# Patient Record
Sex: MALE | Race: WHITE | Employment: OTHER | URBAN - METROPOLITAN AREA
[De-identification: names, ages, dates, MRNs, and addresses within clinical notes are randomized per-mention and may not be internally consistent; named-entity substitution may affect disease eponyms.]

---

## 2017-04-24 PROBLEM — R63.4 WEIGHT LOSS: Status: ACTIVE | Noted: 2017-04-24

## 2017-04-27 PROBLEM — R01.1 HEART MURMUR: Status: ACTIVE | Noted: 2017-04-27

## 2017-06-06 ENCOUNTER — HOSPITAL ENCOUNTER (OUTPATIENT)
Dept: CT IMAGING | Age: 72
Discharge: HOME | End: 2017-06-06
Attending: INTERNAL MEDICINE
Payer: MEDICARE

## 2017-06-06 VITALS — BODY MASS INDEX: 30.1 KG/M2 | WEIGHT: 215 LBS | HEIGHT: 71 IN

## 2017-06-06 DIAGNOSIS — R22.1 MASS IN NECK: ICD-10-CM

## 2017-06-06 DIAGNOSIS — R93.89 ABNORMAL DOPPLER ULTRASOUND OF CAROTID ARTERY: ICD-10-CM

## 2017-06-06 LAB — CREAT BLD-MCNC: 1.1 MG/DL (ref 0.6–1)

## 2017-06-06 PROCEDURE — 74011000258 HC RX REV CODE- 258: Performed by: INTERNAL MEDICINE

## 2017-06-06 PROCEDURE — 74011636320 HC RX REV CODE- 636/320: Performed by: INTERNAL MEDICINE

## 2017-06-06 PROCEDURE — 70498 CT ANGIOGRAPHY NECK: CPT

## 2017-06-06 PROCEDURE — 82565 ASSAY OF CREATININE: CPT

## 2017-06-06 RX ORDER — SODIUM CHLORIDE 0.9 % (FLUSH) 0.9 %
10 SYRINGE (ML) INJECTION
Status: COMPLETED | OUTPATIENT
Start: 2017-06-06 | End: 2017-06-06

## 2017-06-06 RX ADMIN — Medication 10 ML: at 10:45

## 2017-06-06 RX ADMIN — SODIUM CHLORIDE 100 ML: 900 INJECTION, SOLUTION INTRAVENOUS at 10:45

## 2017-06-06 RX ADMIN — IOPAMIDOL 80 ML: 755 INJECTION, SOLUTION INTRAVENOUS at 10:45

## 2017-06-06 NOTE — PROGRESS NOTES
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 For both of these, pt needs a referral to Vascular; please ask to make the appt soon.

## 2017-07-20 PROBLEM — I35.0 NONRHEUMATIC AORTIC VALVE STENOSIS: Status: ACTIVE | Noted: 2017-07-20

## 2017-07-20 PROBLEM — D44.6 CAROTID BODY TUMOR (HCC): Status: ACTIVE | Noted: 2017-07-20

## 2017-08-28 PROBLEM — R79.89 ELEVATED LIVER FUNCTION TESTS: Status: ACTIVE | Noted: 2017-08-28

## 2017-08-28 PROBLEM — E55.9 VITAMIN D DEFICIENCY: Status: ACTIVE | Noted: 2017-08-28

## 2018-07-12 PROBLEM — R01.1 HEART MURMUR: Status: RESOLVED | Noted: 2017-04-27 | Resolved: 2018-07-12

## 2018-08-02 ENCOUNTER — HOSPITAL ENCOUNTER (OUTPATIENT)
Dept: ULTRASOUND IMAGING | Age: 73
Discharge: HOME | End: 2018-08-02
Attending: INTERNAL MEDICINE
Payer: MEDICARE

## 2018-08-02 DIAGNOSIS — R79.89 ELEVATED LIVER FUNCTION TESTS: ICD-10-CM

## 2018-08-02 PROCEDURE — 76700 US EXAM ABDOM COMPLETE: CPT

## 2018-08-02 NOTE — PROGRESS NOTES
US does not show fatty liver--has some increase in spleen and fluid in abdomen that can be seen in chronic liver disease though-does have gallstones--I think a GI opinion about the liver enzymes may be reasonable

## 2019-01-10 PROBLEM — R68.89 COLD INTOLERANCE: Status: ACTIVE | Noted: 2019-01-10

## 2019-01-10 PROBLEM — Z00.00 ROUTINE GENERAL MEDICAL EXAMINATION AT A HEALTH CARE FACILITY: Status: ACTIVE | Noted: 2019-01-10

## 2019-01-10 PROBLEM — Z23 ENCOUNTER FOR IMMUNIZATION: Status: ACTIVE | Noted: 2019-01-10

## 2019-01-14 PROBLEM — K74.69 OTHER CIRRHOSIS OF LIVER (HCC): Status: ACTIVE | Noted: 2017-08-28

## 2019-04-16 ENCOUNTER — ANESTHESIA EVENT (OUTPATIENT)
Dept: ENDOSCOPY | Age: 74
End: 2019-04-16
Payer: MEDICARE

## 2019-04-16 ENCOUNTER — HOSPITAL ENCOUNTER (OUTPATIENT)
Age: 74
Setting detail: OUTPATIENT SURGERY
Discharge: HOME | End: 2019-04-16
Attending: INTERNAL MEDICINE | Admitting: INTERNAL MEDICINE
Payer: MEDICARE

## 2019-04-16 ENCOUNTER — ANESTHESIA (OUTPATIENT)
Dept: ENDOSCOPY | Age: 74
End: 2019-04-16
Payer: MEDICARE

## 2019-04-16 VITALS
TEMPERATURE: 98.6 F | RESPIRATION RATE: 18 BRPM | BODY MASS INDEX: 27.92 KG/M2 | HEIGHT: 70 IN | OXYGEN SATURATION: 96 % | SYSTOLIC BLOOD PRESSURE: 125 MMHG | WEIGHT: 195 LBS | DIASTOLIC BLOOD PRESSURE: 64 MMHG | HEART RATE: 97 BPM

## 2019-04-16 LAB — GLUCOSE BLD STRIP.AUTO-MCNC: 98 MG/DL (ref 65–100)

## 2019-04-16 PROCEDURE — 82962 GLUCOSE BLOOD TEST: CPT

## 2019-04-16 PROCEDURE — 74011250636 HC RX REV CODE- 250/636

## 2019-04-16 PROCEDURE — 88312 SPECIAL STAINS GROUP 1: CPT

## 2019-04-16 PROCEDURE — 77030009426 HC FCPS BIOP ENDOSC BSC -B: Performed by: INTERNAL MEDICINE

## 2019-04-16 PROCEDURE — 88305 TISSUE EXAM BY PATHOLOGIST: CPT

## 2019-04-16 PROCEDURE — 76040000025: Performed by: INTERNAL MEDICINE

## 2019-04-16 PROCEDURE — 76060000031 HC ANESTHESIA FIRST 0.5 HR: Performed by: INTERNAL MEDICINE

## 2019-04-16 PROCEDURE — 74011250636 HC RX REV CODE- 250/636: Performed by: ANESTHESIOLOGY

## 2019-04-16 RX ORDER — SODIUM CHLORIDE, SODIUM LACTATE, POTASSIUM CHLORIDE, CALCIUM CHLORIDE 600; 310; 30; 20 MG/100ML; MG/100ML; MG/100ML; MG/100ML
25 INJECTION, SOLUTION INTRAVENOUS CONTINUOUS
Status: CANCELLED | OUTPATIENT
Start: 2019-04-16 | End: 2019-04-17

## 2019-04-16 RX ORDER — SODIUM CHLORIDE, SODIUM LACTATE, POTASSIUM CHLORIDE, CALCIUM CHLORIDE 600; 310; 30; 20 MG/100ML; MG/100ML; MG/100ML; MG/100ML
100 INJECTION, SOLUTION INTRAVENOUS CONTINUOUS
Status: DISCONTINUED | OUTPATIENT
Start: 2019-04-16 | End: 2019-04-16 | Stop reason: HOSPADM

## 2019-04-16 RX ORDER — PROPOFOL 10 MG/ML
INJECTION, EMULSION INTRAVENOUS AS NEEDED
Status: DISCONTINUED | OUTPATIENT
Start: 2019-04-16 | End: 2019-04-16 | Stop reason: HOSPADM

## 2019-04-16 RX ADMIN — PROPOFOL 20 MG: 10 INJECTION, EMULSION INTRAVENOUS at 10:58

## 2019-04-16 RX ADMIN — PROPOFOL 10 MG: 10 INJECTION, EMULSION INTRAVENOUS at 11:01

## 2019-04-16 RX ADMIN — PROPOFOL 50 MG: 10 INJECTION, EMULSION INTRAVENOUS at 10:56

## 2019-04-16 RX ADMIN — PROPOFOL 10 MG: 10 INJECTION, EMULSION INTRAVENOUS at 11:00

## 2019-04-16 RX ADMIN — SODIUM CHLORIDE, SODIUM LACTATE, POTASSIUM CHLORIDE, AND CALCIUM CHLORIDE 100 ML/HR: 600; 310; 30; 20 INJECTION, SOLUTION INTRAVENOUS at 09:58

## 2019-04-16 NOTE — ANESTHESIA POSTPROCEDURE EVALUATION
Procedure(s): ESOPHAGOGASTRODUODENOSCOPY (EGD)/ 30 
ESOPHAGOGASTRODUODENAL (EGD) BIOPSY. total IV anesthesia Anesthesia Post Evaluation Multimodal analgesia: multimodal analgesia not used between 6 hours prior to anesthesia start to PACU discharge Patient location during evaluation: bedside Patient participation: complete - patient participated Level of consciousness: awake and alert Pain management: adequate Airway patency: patent Anesthetic complications: no 
Cardiovascular status: hemodynamically stable Respiratory status: acceptable Hydration status: acceptable Vitals Value Taken Time /64 4/16/2019 11:39 AM  
Temp 37 °C (98.6 °F) 4/16/2019 11:10 AM  
Pulse 101 4/16/2019 11:42 AM  
Resp 18 4/16/2019 11:39 AM  
SpO2 98 % 4/16/2019 11:41 AM  
Vitals shown include unvalidated device data.

## 2019-04-16 NOTE — ROUTINE PROCESS
Pt. Discharged to car by Marge Larson with family . Vital signs stable. Able to tolerate PO fluids. Passing gas.  Seen by MD.

## 2019-04-16 NOTE — DISCHARGE INSTRUCTIONS
Gastrointestinal Esophagogastroduodenoscopy (EGD) - Upper Exam Discharge Instructions    1. Call Dr. Bimal Uribe at [phone] for any problems or questions. 2. Contact the doctor's office for follow up appointment as directed. 3. Medication may cause drowsiness for several hours, therefore:  · Do not drive or operate machinery for remainder of the day. · No alcohol today. · Do not make any important or legal decisions for 24 hours. · Do not sign any legal documents for 24 hours. 5. Ordinarily, you may resume regular diet and activity after exam unless otherwise specified by your physician. 6. For mild soreness in your throat you may use  throat lozenges or warm salt-water gargles as needed. Any additional instructions: Follow up as needed     Instructions given to Wol  and other family members.

## 2019-04-16 NOTE — ANESTHESIA PREPROCEDURE EVALUATION
Relevant Problems No relevant active problems Anesthetic History Review of Systems / Medical History Pulmonary Neuro/Psych Cardiovascular Hypertension: well controlled Valvular problems/murmurs: aortic stenosis Exercise tolerance: >4 METS 
  
GI/Hepatic/Renal 
  
 
 
 
Liver disease Endo/Other Diabetes: type 2 Other Findings Physical Exam 
 
Airway Mallampati: II 
TM Distance: > 6 cm Neck ROM: normal range of motion Mouth opening: Normal 
 
 Cardiovascular Rhythm: regular Rate: abnormal 
 
Murmur: Grade 3, Aortic area Dental 
No notable dental hx Pulmonary Breath sounds clear to auscultation Abdominal 
 
 
 
 Other Findings Anesthetic Plan ASA: 3 Anesthesia type: total IV anesthesia Anesthetic plan and risks discussed with: Patient

## 2019-06-10 PROBLEM — K42.9 UMBILICAL HERNIA WITHOUT OBSTRUCTION AND WITHOUT GANGRENE: Status: ACTIVE | Noted: 2019-06-10

## 2019-09-23 PROBLEM — Z23 ENCOUNTER FOR IMMUNIZATION: Status: RESOLVED | Noted: 2019-01-10 | Resolved: 2019-09-23

## 2019-09-23 PROBLEM — Z00.00 ROUTINE GENERAL MEDICAL EXAMINATION AT A HEALTH CARE FACILITY: Status: RESOLVED | Noted: 2019-01-10 | Resolved: 2019-09-23

## 2019-10-16 PROBLEM — R18.8 OTHER ASCITES: Status: ACTIVE | Noted: 2019-10-16

## 2019-11-20 ENCOUNTER — HOSPITAL ENCOUNTER (OUTPATIENT)
Dept: LAB | Age: 74
Discharge: HOME | End: 2019-11-20
Payer: MEDICARE

## 2019-11-20 DIAGNOSIS — K74.60 CIRRHOSIS OF LIVER WITH ASCITES, UNSPECIFIED HEPATIC CIRRHOSIS TYPE (HCC): ICD-10-CM

## 2019-11-20 DIAGNOSIS — R18.8 CIRRHOSIS OF LIVER WITH ASCITES, UNSPECIFIED HEPATIC CIRRHOSIS TYPE (HCC): ICD-10-CM

## 2019-11-20 LAB
ALBUMIN SERPL-MCNC: 3.1 G/DL (ref 3.2–4.6)
ALBUMIN/GLOB SERPL: 0.7 {RATIO} (ref 1.2–3.5)
ALP SERPL-CCNC: 133 U/L (ref 50–136)
ALT SERPL-CCNC: 40 U/L (ref 12–65)
ANION GAP SERPL CALC-SCNC: 6 MMOL/L (ref 7–16)
AST SERPL-CCNC: 54 U/L (ref 15–37)
BILIRUB SERPL-MCNC: 1 MG/DL (ref 0.2–1.1)
BUN SERPL-MCNC: 31 MG/DL (ref 8–23)
CALCIUM SERPL-MCNC: 9 MG/DL (ref 8.3–10.4)
CHLORIDE SERPL-SCNC: 106 MMOL/L (ref 98–107)
CO2 SERPL-SCNC: 25 MMOL/L (ref 21–32)
CREAT SERPL-MCNC: 1.29 MG/DL (ref 0.8–1.5)
ERYTHROCYTE [DISTWIDTH] IN BLOOD BY AUTOMATED COUNT: 13.2 % (ref 11.9–14.6)
GLOBULIN SER CALC-MCNC: 4.5 G/DL (ref 2.3–3.5)
GLUCOSE SERPL-MCNC: 119 MG/DL (ref 65–100)
HCT VFR BLD AUTO: 32.6 % (ref 41.1–50.3)
HGB BLD-MCNC: 11.1 G/DL (ref 13.6–17.2)
INR PPP: 1.3
MCH RBC QN AUTO: 35.1 PG (ref 26.1–32.9)
MCHC RBC AUTO-ENTMCNC: 34 G/DL (ref 31.4–35)
MCV RBC AUTO: 103.2 FL (ref 79.6–97.8)
NRBC # BLD: 0 K/UL (ref 0–0.2)
PLATELET # BLD AUTO: 75 K/UL (ref 150–450)
PMV BLD AUTO: 11.5 FL (ref 9.4–12.3)
POTASSIUM SERPL-SCNC: 4.9 MMOL/L (ref 3.5–5.1)
PROT SERPL-MCNC: 7.6 G/DL (ref 6.3–8.2)
PROTHROMBIN TIME: 16.2 SEC (ref 11.7–14.5)
RBC # BLD AUTO: 3.16 M/UL (ref 4.23–5.6)
SODIUM SERPL-SCNC: 137 MMOL/L (ref 136–145)
WBC # BLD AUTO: 3.5 K/UL (ref 4.3–11.1)

## 2019-11-20 PROCEDURE — 85610 PROTHROMBIN TIME: CPT

## 2019-11-20 PROCEDURE — 85027 COMPLETE CBC AUTOMATED: CPT

## 2019-11-20 PROCEDURE — 36415 COLL VENOUS BLD VENIPUNCTURE: CPT

## 2019-11-20 PROCEDURE — 80053 COMPREHEN METABOLIC PANEL: CPT

## 2020-01-01 ENCOUNTER — HOSPITAL ENCOUNTER (OUTPATIENT)
Dept: LAB | Age: 75
Discharge: HOME | End: 2020-11-03
Payer: MEDICARE

## 2020-01-01 DIAGNOSIS — I35.0 NONRHEUMATIC AORTIC VALVE STENOSIS: ICD-10-CM

## 2020-01-01 LAB
ALBUMIN SERPL-MCNC: 3.4 G/DL (ref 3.2–4.6)
ALBUMIN/GLOB SERPL: 0.9 {RATIO} (ref 1.2–3.5)
ALP SERPL-CCNC: 97 U/L (ref 50–136)
ALT SERPL-CCNC: 40 U/L (ref 12–65)
ANION GAP SERPL CALC-SCNC: 6 MMOL/L (ref 7–16)
AST SERPL-CCNC: 53 U/L (ref 15–37)
BILIRUB SERPL-MCNC: 2.4 MG/DL (ref 0.2–1.1)
BUN SERPL-MCNC: 22 MG/DL (ref 8–23)
CALCIUM SERPL-MCNC: 9.2 MG/DL (ref 8.3–10.4)
CHLORIDE SERPL-SCNC: 102 MMOL/L (ref 98–107)
CO2 SERPL-SCNC: 24 MMOL/L (ref 21–32)
CREAT SERPL-MCNC: 1.24 MG/DL (ref 0.8–1.5)
GLOBULIN SER CALC-MCNC: 3.9 G/DL (ref 2.3–3.5)
GLUCOSE SERPL-MCNC: 131 MG/DL (ref 65–100)
MAGNESIUM SERPL-MCNC: 1.9 MG/DL (ref 1.8–2.4)
POTASSIUM SERPL-SCNC: 4.8 MMOL/L (ref 3.5–5.1)
PROT SERPL-MCNC: 7.3 G/DL (ref 6.3–8.2)
SODIUM SERPL-SCNC: 132 MMOL/L (ref 136–145)

## 2020-01-01 PROCEDURE — 83735 ASSAY OF MAGNESIUM: CPT

## 2020-01-01 PROCEDURE — 36415 COLL VENOUS BLD VENIPUNCTURE: CPT

## 2020-01-01 PROCEDURE — 80053 COMPREHEN METABOLIC PANEL: CPT

## 2020-02-10 PROBLEM — R63.4 WEIGHT LOSS: Status: RESOLVED | Noted: 2017-04-24 | Resolved: 2020-02-10

## 2020-12-03 PROBLEM — D69.6 THROMBOCYTOPENIA (HCC): Status: ACTIVE | Noted: 2020-01-01

## 2021-01-01 ENCOUNTER — HOSPITAL ENCOUNTER (OUTPATIENT)
Dept: CT IMAGING | Age: 76
Discharge: HOME | End: 2021-01-11
Attending: INTERNAL MEDICINE
Payer: MEDICARE

## 2021-01-01 ENCOUNTER — APPOINTMENT (OUTPATIENT)
Dept: ULTRASOUND IMAGING | Age: 76
DRG: 442 | End: 2021-01-01
Attending: INTERNAL MEDICINE
Payer: MEDICARE

## 2021-01-01 ENCOUNTER — HOME CARE VISIT (OUTPATIENT)
Dept: SCHEDULING | Facility: HOME HEALTH | Age: 76
End: 2021-01-01
Payer: MEDICARE

## 2021-01-01 ENCOUNTER — HOME CARE VISIT (OUTPATIENT)
Dept: HOSPICE | Facility: HOSPICE | Age: 76
End: 2021-01-01
Payer: MEDICARE

## 2021-01-01 ENCOUNTER — ANESTHESIA EVENT (OUTPATIENT)
Dept: ENDOSCOPY | Age: 76
DRG: 442 | End: 2021-01-01
Payer: MEDICARE

## 2021-01-01 ENCOUNTER — HOSPITAL ENCOUNTER (OUTPATIENT)
Dept: LAB | Age: 76
Discharge: HOME | End: 2021-03-15
Payer: MEDICARE

## 2021-01-01 ENCOUNTER — HOSPITAL ENCOUNTER (OUTPATIENT)
Dept: INTERVENTIONAL RADIOLOGY/VASCULAR | Age: 76
Discharge: HOME | End: 2021-05-04
Attending: INTERNAL MEDICINE
Payer: MEDICARE

## 2021-01-01 ENCOUNTER — TELEPHONE (OUTPATIENT)
Dept: CASE MANAGEMENT | Age: 76
End: 2021-01-01

## 2021-01-01 ENCOUNTER — HOSPICE ADMISSION (OUTPATIENT)
Dept: HOSPICE | Facility: HOSPICE | Age: 76
End: 2021-01-01

## 2021-01-01 ENCOUNTER — HOSPICE ADMISSION (OUTPATIENT)
Dept: HOSPICE | Facility: HOSPICE | Age: 76
End: 2021-01-01
Payer: MEDICARE

## 2021-01-01 ENCOUNTER — APPOINTMENT (OUTPATIENT)
Dept: GENERAL RADIOLOGY | Age: 76
DRG: 442 | End: 2021-01-01
Attending: EMERGENCY MEDICINE
Payer: MEDICARE

## 2021-01-01 ENCOUNTER — HOSPITAL ENCOUNTER (INPATIENT)
Age: 76
LOS: 10 days | Discharge: NURSING, REHAB OR LONG-TERM CARE | DRG: 442 | End: 2021-05-28
Attending: EMERGENCY MEDICINE | Admitting: INTERNAL MEDICINE
Payer: MEDICARE

## 2021-01-01 ENCOUNTER — HOSPITAL ENCOUNTER (OUTPATIENT)
Dept: INTERVENTIONAL RADIOLOGY/VASCULAR | Age: 76
Discharge: HOME | End: 2021-03-31
Attending: INTERNAL MEDICINE
Payer: MEDICARE

## 2021-01-01 ENCOUNTER — APPOINTMENT (OUTPATIENT)
Dept: CT IMAGING | Age: 76
DRG: 442 | End: 2021-01-01
Attending: FAMILY MEDICINE
Payer: MEDICARE

## 2021-01-01 ENCOUNTER — ANESTHESIA (OUTPATIENT)
Dept: ENDOSCOPY | Age: 76
DRG: 442 | End: 2021-01-01
Payer: MEDICARE

## 2021-01-01 VITALS
RESPIRATION RATE: 8 BRPM | SYSTOLIC BLOOD PRESSURE: 120 MMHG | TEMPERATURE: 97.1 F | DIASTOLIC BLOOD PRESSURE: 90 MMHG | HEART RATE: 140 BPM

## 2021-01-01 VITALS
BODY MASS INDEX: 28.49 KG/M2 | HEART RATE: 111 BPM | OXYGEN SATURATION: 100 % | RESPIRATION RATE: 18 BRPM | WEIGHT: 199 LBS | HEIGHT: 70 IN | TEMPERATURE: 97.6 F | SYSTOLIC BLOOD PRESSURE: 138 MMHG | DIASTOLIC BLOOD PRESSURE: 70 MMHG

## 2021-01-01 VITALS
RESPIRATION RATE: 22 BRPM | SYSTOLIC BLOOD PRESSURE: 100 MMHG | DIASTOLIC BLOOD PRESSURE: 60 MMHG | TEMPERATURE: 99 F | HEART RATE: 90 BPM

## 2021-01-01 VITALS
WEIGHT: 166.1 LBS | HEIGHT: 70 IN | BODY MASS INDEX: 23.78 KG/M2 | DIASTOLIC BLOOD PRESSURE: 76 MMHG | SYSTOLIC BLOOD PRESSURE: 110 MMHG | RESPIRATION RATE: 20 BRPM | HEART RATE: 77 BPM | OXYGEN SATURATION: 98 % | TEMPERATURE: 98 F

## 2021-01-01 VITALS
OXYGEN SATURATION: 99 % | DIASTOLIC BLOOD PRESSURE: 79 MMHG | TEMPERATURE: 97.1 F | SYSTOLIC BLOOD PRESSURE: 148 MMHG | HEART RATE: 108 BPM

## 2021-01-01 VITALS
WEIGHT: 170 LBS | OXYGEN SATURATION: 96 % | BODY MASS INDEX: 24.34 KG/M2 | HEIGHT: 70 IN | DIASTOLIC BLOOD PRESSURE: 62 MMHG | SYSTOLIC BLOOD PRESSURE: 102 MMHG | TEMPERATURE: 98.4 F | HEART RATE: 82 BPM | RESPIRATION RATE: 18 BRPM

## 2021-01-01 DIAGNOSIS — K70.31 ALCOHOLIC CIRRHOSIS OF LIVER WITH ASCITES (HCC): ICD-10-CM

## 2021-01-01 DIAGNOSIS — R18.8 OTHER ASCITES: ICD-10-CM

## 2021-01-01 DIAGNOSIS — R11.0 NAUSEA: ICD-10-CM

## 2021-01-01 DIAGNOSIS — K74.69 OTHER CIRRHOSIS OF LIVER (HCC): ICD-10-CM

## 2021-01-01 DIAGNOSIS — E87.5 ACUTE HYPERKALEMIA: ICD-10-CM

## 2021-01-01 DIAGNOSIS — K92.2 GASTROINTESTINAL HEMORRHAGE, UNSPECIFIED GASTROINTESTINAL HEMORRHAGE TYPE: Primary | ICD-10-CM

## 2021-01-01 DIAGNOSIS — Z51.5 ENCOUNTER FOR PALLIATIVE CARE: ICD-10-CM

## 2021-01-01 DIAGNOSIS — K74.60 UNSPECIFIED CIRRHOSIS OF LIVER (HCC): ICD-10-CM

## 2021-01-01 DIAGNOSIS — R41.0 DISORIENTATION: ICD-10-CM

## 2021-01-01 DIAGNOSIS — R54 FRAILTY: ICD-10-CM

## 2021-01-01 DIAGNOSIS — I35.0 AORTIC VALVE STENOSIS, ETIOLOGY OF CARDIAC VALVE DISEASE UNSPECIFIED: ICD-10-CM

## 2021-01-01 DIAGNOSIS — I35.0 AORTIC VALVE STENOSIS, ETIOLOGY OF CARDIAC VALVE DISEASE UNSPECIFIED: Primary | ICD-10-CM

## 2021-01-01 DIAGNOSIS — Z71.89 ADVANCED CARE PLANNING/COUNSELING DISCUSSION: ICD-10-CM

## 2021-01-01 DIAGNOSIS — E72.20 HYPERAMMONEMIA (HCC): ICD-10-CM

## 2021-01-01 DIAGNOSIS — N17.9 AKI (ACUTE KIDNEY INJURY) (HCC): ICD-10-CM

## 2021-01-01 DIAGNOSIS — K76.6 PORTAL HYPERTENSION (HCC): ICD-10-CM

## 2021-01-01 DIAGNOSIS — R53.83 FATIGUE, UNSPECIFIED TYPE: ICD-10-CM

## 2021-01-01 DIAGNOSIS — K42.9 UMBILICAL HERNIA WITHOUT OBSTRUCTION AND WITHOUT GANGRENE: ICD-10-CM

## 2021-01-01 DIAGNOSIS — E87.1 HYPONATREMIA: ICD-10-CM

## 2021-01-01 LAB
ABO + RH BLD: NORMAL
ALBUMIN SERPL-MCNC: 1.9 G/DL (ref 3.2–4.6)
ALBUMIN SERPL-MCNC: 2.2 G/DL (ref 3.2–4.6)
ALBUMIN SERPL-MCNC: 2.8 G/DL (ref 3.2–4.6)
ALBUMIN SERPL-MCNC: 3 G/DL (ref 3.2–4.6)
ALBUMIN SERPL-MCNC: 3 G/DL (ref 3.2–4.6)
ALBUMIN/GLOB SERPL: 0.6 {RATIO} (ref 1.2–3.5)
ALBUMIN/GLOB SERPL: 0.7 {RATIO} (ref 1.2–3.5)
ALBUMIN/GLOB SERPL: 0.8 {RATIO} (ref 1.2–3.5)
ALBUMIN/GLOB SERPL: 0.9 {RATIO} (ref 1.2–3.5)
ALBUMIN/GLOB SERPL: 0.9 {RATIO} (ref 1.2–3.5)
ALP SERPL-CCNC: 107 U/L (ref 50–136)
ALP SERPL-CCNC: 108 U/L (ref 50–136)
ALP SERPL-CCNC: 119 U/L (ref 50–136)
ALP SERPL-CCNC: 124 U/L (ref 50–136)
ALP SERPL-CCNC: 139 U/L (ref 50–136)
ALP SERPL-CCNC: 85 U/L (ref 50–136)
ALP SERPL-CCNC: 97 U/L (ref 50–136)
ALT SERPL-CCNC: 26 U/L (ref 12–65)
ALT SERPL-CCNC: 27 U/L (ref 12–65)
ALT SERPL-CCNC: 30 U/L (ref 12–65)
ALT SERPL-CCNC: 32 U/L (ref 12–65)
ALT SERPL-CCNC: 33 U/L (ref 12–65)
ALT SERPL-CCNC: 34 U/L (ref 12–65)
ALT SERPL-CCNC: 36 U/L (ref 12–65)
AMMONIA PLAS-SCNC: 42 UMOL/L (ref 11–32)
AMMONIA PLAS-SCNC: 47 UMOL/L (ref 11–32)
AMMONIA PLAS-SCNC: 61 UMOL/L (ref 11–32)
AMMONIA PLAS-SCNC: 77 UMOL/L (ref 11–32)
AMMONIA PLAS-SCNC: 87 UMOL/L (ref 11–32)
ANION GAP SERPL CALC-SCNC: 10 MMOL/L (ref 7–16)
ANION GAP SERPL CALC-SCNC: 6 MMOL/L (ref 7–16)
ANION GAP SERPL CALC-SCNC: 6 MMOL/L (ref 7–16)
ANION GAP SERPL CALC-SCNC: 7 MMOL/L (ref 7–16)
ANION GAP SERPL CALC-SCNC: 8 MMOL/L (ref 7–16)
ANION GAP SERPL CALC-SCNC: 8 MMOL/L (ref 7–16)
ANION GAP SERPL CALC-SCNC: 9 MMOL/L (ref 7–16)
ANION GAP SERPL CALC-SCNC: 9 MMOL/L (ref 7–16)
APPEARANCE UR: CLEAR
ARTERIAL PATENCY WRIST A: ABNORMAL
AST SERPL-CCNC: 31 U/L (ref 15–37)
AST SERPL-CCNC: 33 U/L (ref 15–37)
AST SERPL-CCNC: 34 U/L (ref 15–37)
AST SERPL-CCNC: 37 U/L (ref 15–37)
AST SERPL-CCNC: 40 U/L (ref 15–37)
AST SERPL-CCNC: 48 U/L (ref 15–37)
AST SERPL-CCNC: 51 U/L (ref 15–37)
ATRIAL RATE: 111 BPM
ATRIAL RATE: 115 BPM
BACTERIA URNS QL MICRO: 0 /HPF
BASE EXCESS BLDV CALC-SCNC: 0 MMOL/L
BASOPHILS # BLD: 0 K/UL (ref 0–0.2)
BASOPHILS # BLD: 0.1 K/UL (ref 0–0.2)
BASOPHILS NFR BLD: 0 % (ref 0–2)
BILIRUB DIRECT SERPL-MCNC: 0.9 MG/DL
BILIRUB SERPL-MCNC: 1.2 MG/DL (ref 0.2–1.1)
BILIRUB SERPL-MCNC: 1.3 MG/DL (ref 0.2–1.1)
BILIRUB SERPL-MCNC: 1.3 MG/DL (ref 0.2–1.1)
BILIRUB SERPL-MCNC: 1.6 MG/DL (ref 0.2–1.1)
BILIRUB SERPL-MCNC: 1.9 MG/DL (ref 0.2–1.1)
BILIRUB SERPL-MCNC: 2.1 MG/DL (ref 0.2–1.1)
BILIRUB SERPL-MCNC: 2.4 MG/DL (ref 0.2–1.1)
BILIRUB UR QL: NEGATIVE
BLOOD GROUP ANTIBODIES SERPL: NORMAL
BUN SERPL-MCNC: 35 MG/DL (ref 8–23)
BUN SERPL-MCNC: 42 MG/DL (ref 8–23)
BUN SERPL-MCNC: 44 MG/DL (ref 8–23)
BUN SERPL-MCNC: 45 MG/DL (ref 8–23)
BUN SERPL-MCNC: 50 MG/DL (ref 8–23)
BUN SERPL-MCNC: 52 MG/DL (ref 8–23)
BUN SERPL-MCNC: 55 MG/DL (ref 8–23)
BUN SERPL-MCNC: 63 MG/DL (ref 8–23)
BUN SERPL-MCNC: 66 MG/DL (ref 8–23)
CALCIUM SERPL-MCNC: 8 MG/DL (ref 8.3–10.4)
CALCIUM SERPL-MCNC: 8.1 MG/DL (ref 8.3–10.4)
CALCIUM SERPL-MCNC: 8.1 MG/DL (ref 8.3–10.4)
CALCIUM SERPL-MCNC: 8.2 MG/DL (ref 8.3–10.4)
CALCIUM SERPL-MCNC: 8.2 MG/DL (ref 8.3–10.4)
CALCIUM SERPL-MCNC: 8.3 MG/DL (ref 8.3–10.4)
CALCIUM SERPL-MCNC: 8.9 MG/DL (ref 8.3–10.4)
CALCIUM SERPL-MCNC: 9 MG/DL (ref 8.3–10.4)
CALCIUM SERPL-MCNC: 9.1 MG/DL (ref 8.3–10.4)
CALCULATED P AXIS, ECG09: 61 DEGREES
CALCULATED P AXIS, ECG09: 68 DEGREES
CALCULATED R AXIS, ECG10: 12 DEGREES
CALCULATED R AXIS, ECG10: 2 DEGREES
CALCULATED T AXIS, ECG11: 68 DEGREES
CALCULATED T AXIS, ECG11: 79 DEGREES
CASTS URNS QL MICRO: ABNORMAL /LPF
CHLORIDE SERPL-SCNC: 100 MMOL/L (ref 98–107)
CHLORIDE SERPL-SCNC: 95 MMOL/L (ref 98–107)
CHLORIDE SERPL-SCNC: 96 MMOL/L (ref 98–107)
CHLORIDE SERPL-SCNC: 97 MMOL/L (ref 98–107)
CHLORIDE SERPL-SCNC: 97 MMOL/L (ref 98–107)
CHLORIDE SERPL-SCNC: 98 MMOL/L (ref 98–107)
CHLORIDE SERPL-SCNC: 98 MMOL/L (ref 98–107)
CHLORIDE SERPL-SCNC: 99 MMOL/L (ref 98–107)
CHLORIDE SERPL-SCNC: 99 MMOL/L (ref 98–107)
CO2 SERPL-SCNC: 21 MMOL/L (ref 21–32)
CO2 SERPL-SCNC: 22 MMOL/L (ref 21–32)
CO2 SERPL-SCNC: 23 MMOL/L (ref 21–32)
CO2 SERPL-SCNC: 23 MMOL/L (ref 21–32)
CO2 SERPL-SCNC: 24 MMOL/L (ref 21–32)
CO2 SERPL-SCNC: 25 MMOL/L (ref 21–32)
CO2 SERPL-SCNC: 27 MMOL/L (ref 21–32)
CO2 SERPL-SCNC: 28 MMOL/L (ref 21–32)
COLOR UR: ABNORMAL
COVID-19 RAPID TEST, COVR: NOT DETECTED
CREAT BLD-MCNC: 1.2 MG/DL (ref 0.8–1.5)
CREAT SERPL-MCNC: 1.26 MG/DL (ref 0.8–1.5)
CREAT SERPL-MCNC: 1.33 MG/DL (ref 0.8–1.5)
CREAT SERPL-MCNC: 1.38 MG/DL (ref 0.8–1.5)
CREAT SERPL-MCNC: 1.44 MG/DL (ref 0.8–1.5)
CREAT SERPL-MCNC: 1.45 MG/DL (ref 0.8–1.5)
CREAT SERPL-MCNC: 1.47 MG/DL (ref 0.8–1.5)
CREAT SERPL-MCNC: 1.56 MG/DL (ref 0.8–1.5)
CREAT SERPL-MCNC: 1.59 MG/DL (ref 0.8–1.5)
CREAT SERPL-MCNC: 1.76 MG/DL (ref 0.8–1.5)
CREAT SERPL-MCNC: 1.77 MG/DL (ref 0.8–1.5)
CREAT SERPL-MCNC: 2.03 MG/DL (ref 0.8–1.5)
DIAGNOSIS, 93000: NORMAL
DIAGNOSIS, 93000: NORMAL
DIFFERENTIAL METHOD BLD: ABNORMAL
EOSINOPHIL # BLD: 0 K/UL (ref 0–0.8)
EOSINOPHIL # BLD: 0.1 K/UL (ref 0–0.8)
EOSINOPHIL # BLD: 0.3 K/UL (ref 0–0.8)
EOSINOPHIL NFR BLD: 0 % (ref 0.5–7.8)
EOSINOPHIL NFR BLD: 0 % (ref 0.5–7.8)
EOSINOPHIL NFR BLD: 1 % (ref 0.5–7.8)
EOSINOPHIL NFR BLD: 1 % (ref 0.5–7.8)
EOSINOPHIL NFR BLD: 3 % (ref 0.5–7.8)
EPI CELLS #/AREA URNS HPF: ABNORMAL /HPF
ERYTHROCYTE [DISTWIDTH] IN BLOOD BY AUTOMATED COUNT: 14.6 % (ref 11.9–14.6)
ERYTHROCYTE [DISTWIDTH] IN BLOOD BY AUTOMATED COUNT: 14.7 % (ref 11.9–14.6)
ERYTHROCYTE [DISTWIDTH] IN BLOOD BY AUTOMATED COUNT: 14.7 % (ref 11.9–14.6)
ERYTHROCYTE [DISTWIDTH] IN BLOOD BY AUTOMATED COUNT: 15 % (ref 11.9–14.6)
ERYTHROCYTE [DISTWIDTH] IN BLOOD BY AUTOMATED COUNT: 15 % (ref 11.9–14.6)
ERYTHROCYTE [DISTWIDTH] IN BLOOD BY AUTOMATED COUNT: 15.1 % (ref 11.9–14.6)
ERYTHROCYTE [DISTWIDTH] IN BLOOD BY AUTOMATED COUNT: 15.2 % (ref 11.9–14.6)
GAS FLOW.O2 O2 DELIVERY SYS: ABNORMAL L/MIN
GLOBULIN SER CALC-MCNC: 2.8 G/DL (ref 2.3–3.5)
GLOBULIN SER CALC-MCNC: 2.9 G/DL (ref 2.3–3.5)
GLOBULIN SER CALC-MCNC: 3 G/DL (ref 2.3–3.5)
GLOBULIN SER CALC-MCNC: 3.1 G/DL (ref 2.3–3.5)
GLOBULIN SER CALC-MCNC: 3.2 G/DL (ref 2.3–3.5)
GLOBULIN SER CALC-MCNC: 3.2 G/DL (ref 2.3–3.5)
GLOBULIN SER CALC-MCNC: 3.7 G/DL (ref 2.3–3.5)
GLUCOSE BLD STRIP.AUTO-MCNC: 146 MG/DL (ref 65–100)
GLUCOSE SERPL-MCNC: 109 MG/DL (ref 65–100)
GLUCOSE SERPL-MCNC: 111 MG/DL (ref 65–100)
GLUCOSE SERPL-MCNC: 118 MG/DL (ref 65–100)
GLUCOSE SERPL-MCNC: 119 MG/DL (ref 65–100)
GLUCOSE SERPL-MCNC: 119 MG/DL (ref 65–100)
GLUCOSE SERPL-MCNC: 125 MG/DL (ref 65–100)
GLUCOSE SERPL-MCNC: 125 MG/DL (ref 65–100)
GLUCOSE SERPL-MCNC: 132 MG/DL (ref 65–100)
GLUCOSE SERPL-MCNC: 134 MG/DL (ref 65–100)
GLUCOSE SERPL-MCNC: 146 MG/DL (ref 65–100)
GLUCOSE SERPL-MCNC: 175 MG/DL (ref 65–100)
GLUCOSE UR STRIP.AUTO-MCNC: NEGATIVE MG/DL
HCO3 BLDV-SCNC: 21 MMOL/L (ref 23–28)
HCT VFR BLD AUTO: 26.5 % (ref 41.1–50.3)
HCT VFR BLD AUTO: 27.8 % (ref 41.1–50.3)
HCT VFR BLD AUTO: 27.9 % (ref 41.1–50.3)
HCT VFR BLD AUTO: 28.1 % (ref 41.1–50.3)
HCT VFR BLD AUTO: 28.2 % (ref 41.1–50.3)
HCT VFR BLD AUTO: 28.7 % (ref 41.1–50.3)
HCT VFR BLD AUTO: 28.8 % (ref 41.1–50.3)
HCT VFR BLD AUTO: 29.6 % (ref 41.1–50.3)
HCT VFR BLD AUTO: 29.8 % (ref 41.1–50.3)
HCT VFR BLD AUTO: 30.7 % (ref 41.1–50.3)
HCT VFR BLD AUTO: 31.1 % (ref 41.1–50.3)
HCT VFR BLD AUTO: 31.1 % (ref 41.1–50.3)
HEMOCCULT STL QL: POSITIVE
HGB BLD-MCNC: 10.1 G/DL (ref 13.6–17.2)
HGB BLD-MCNC: 10.3 G/DL (ref 13.6–17.2)
HGB BLD-MCNC: 10.4 G/DL (ref 13.6–17.2)
HGB BLD-MCNC: 10.5 G/DL (ref 13.6–17.2)
HGB BLD-MCNC: 10.6 G/DL (ref 13.6–17.2)
HGB BLD-MCNC: 10.7 G/DL (ref 13.6–17.2)
HGB BLD-MCNC: 11.1 G/DL (ref 13.6–17.2)
HGB BLD-MCNC: 9.4 G/DL (ref 13.6–17.2)
HGB BLD-MCNC: 9.6 G/DL (ref 13.6–17.2)
HGB BLD-MCNC: 9.8 G/DL (ref 13.6–17.2)
HGB UR QL STRIP: NEGATIVE
IMM GRANULOCYTES # BLD AUTO: 0 K/UL (ref 0–0.5)
IMM GRANULOCYTES # BLD AUTO: 0.1 K/UL (ref 0–0.5)
IMM GRANULOCYTES # BLD AUTO: 0.3 K/UL (ref 0–0.5)
IMM GRANULOCYTES NFR BLD AUTO: 1 % (ref 0–5)
INR PPP: 1.4
KETONES UR QL STRIP.AUTO: ABNORMAL MG/DL
LACTATE SERPL-SCNC: 1.6 MMOL/L (ref 0.4–2)
LACTATE SERPL-SCNC: 2.2 MMOL/L (ref 0.4–2)
LACTATE SERPL-SCNC: 2.2 MMOL/L (ref 0.4–2)
LACTATE SERPL-SCNC: 2.5 MMOL/L (ref 0.4–2)
LACTATE SERPL-SCNC: 2.6 MMOL/L (ref 0.4–2)
LACTATE SERPL-SCNC: 2.8 MMOL/L (ref 0.4–2)
LEUKOCYTE ESTERASE UR QL STRIP.AUTO: NEGATIVE
LYMPHOCYTES # BLD: 0.3 K/UL (ref 0.5–4.6)
LYMPHOCYTES # BLD: 0.5 K/UL (ref 0.5–4.6)
LYMPHOCYTES # BLD: 0.6 K/UL (ref 0.5–4.6)
LYMPHOCYTES # BLD: 0.9 K/UL (ref 0.5–4.6)
LYMPHOCYTES # BLD: 1.1 K/UL (ref 0.5–4.6)
LYMPHOCYTES NFR BLD: 1 % (ref 13–44)
LYMPHOCYTES NFR BLD: 11 % (ref 13–44)
LYMPHOCYTES NFR BLD: 7 % (ref 13–44)
LYMPHOCYTES NFR BLD: 7 % (ref 13–44)
LYMPHOCYTES NFR BLD: 8 % (ref 13–44)
MAGNESIUM SERPL-MCNC: 2.1 MG/DL (ref 1.8–2.4)
MAGNESIUM SERPL-MCNC: 2.2 MG/DL (ref 1.8–2.4)
MCH RBC QN AUTO: 33.7 PG (ref 26.1–32.9)
MCH RBC QN AUTO: 33.8 PG (ref 26.1–32.9)
MCH RBC QN AUTO: 33.9 PG (ref 26.1–32.9)
MCH RBC QN AUTO: 34 PG (ref 26.1–32.9)
MCH RBC QN AUTO: 34 PG (ref 26.1–32.9)
MCH RBC QN AUTO: 34.3 PG (ref 26.1–32.9)
MCH RBC QN AUTO: 34.4 PG (ref 26.1–32.9)
MCHC RBC AUTO-ENTMCNC: 33.8 G/DL (ref 31.4–35)
MCHC RBC AUTO-ENTMCNC: 34.2 G/DL (ref 31.4–35)
MCHC RBC AUTO-ENTMCNC: 34.8 G/DL (ref 31.4–35)
MCHC RBC AUTO-ENTMCNC: 34.9 G/DL (ref 31.4–35)
MCHC RBC AUTO-ENTMCNC: 35.3 G/DL (ref 31.4–35)
MCHC RBC AUTO-ENTMCNC: 35.5 G/DL (ref 31.4–35)
MCHC RBC AUTO-ENTMCNC: 35.7 G/DL (ref 31.4–35)
MCV RBC AUTO: 100.3 FL (ref 79.6–97.8)
MCV RBC AUTO: 95.4 FL (ref 79.6–97.8)
MCV RBC AUTO: 96.5 FL (ref 79.6–97.8)
MCV RBC AUTO: 97.1 FL (ref 79.6–97.8)
MCV RBC AUTO: 97.2 FL (ref 79.6–97.8)
MCV RBC AUTO: 98.4 FL (ref 79.6–97.8)
MCV RBC AUTO: 98.6 FL (ref 79.6–97.8)
MM INDURATION POC: 0 MM (ref 0–5)
MM INDURATION POC: 0 MM (ref 0–5)
MONOCYTES # BLD: 0.5 K/UL (ref 0.1–1.3)
MONOCYTES # BLD: 0.9 K/UL (ref 0.1–1.3)
MONOCYTES # BLD: 1.4 K/UL (ref 0.1–1.3)
MONOCYTES # BLD: 1.6 K/UL (ref 0.1–1.3)
MONOCYTES # BLD: 2.3 K/UL (ref 0.1–1.3)
MONOCYTES NFR BLD: 11 % (ref 4–12)
MONOCYTES NFR BLD: 11 % (ref 4–12)
MONOCYTES NFR BLD: 12 % (ref 4–12)
MONOCYTES NFR BLD: 14 % (ref 4–12)
MONOCYTES NFR BLD: 6 % (ref 4–12)
NEUTS SEG # BLD: 13 K/UL (ref 1.7–8.2)
NEUTS SEG # BLD: 24.2 K/UL (ref 1.7–8.2)
NEUTS SEG # BLD: 3.7 K/UL (ref 1.7–8.2)
NEUTS SEG # BLD: 6.5 K/UL (ref 1.7–8.2)
NEUTS SEG # BLD: 8.8 K/UL (ref 1.7–8.2)
NEUTS SEG NFR BLD: 76 % (ref 43–78)
NEUTS SEG NFR BLD: 76 % (ref 43–78)
NEUTS SEG NFR BLD: 78 % (ref 43–78)
NEUTS SEG NFR BLD: 80 % (ref 43–78)
NEUTS SEG NFR BLD: 91 % (ref 43–78)
NITRITE UR QL STRIP.AUTO: NEGATIVE
NRBC # BLD: 0 K/UL (ref 0–0.2)
O2/TOTAL GAS SETTING VFR VENT: 21 %
OSMOLALITY UR: 460 MOSM/KG H2O (ref 50–1400)
P-R INTERVAL, ECG05: 156 MS
P-R INTERVAL, ECG05: 160 MS
PCO2 BLDV: 23.1 MMHG (ref 41–51)
PH BLDV: 7.57 [PH] (ref 7.32–7.42)
PH UR STRIP: 6 [PH] (ref 5–9)
PLATELET # BLD AUTO: 103 K/UL (ref 150–450)
PLATELET # BLD AUTO: 52 K/UL (ref 150–450)
PLATELET # BLD AUTO: 59 K/UL (ref 150–450)
PLATELET # BLD AUTO: 60 K/UL (ref 150–450)
PLATELET # BLD AUTO: 63 K/UL (ref 150–450)
PLATELET # BLD AUTO: 82 K/UL (ref 150–450)
PLATELET # BLD AUTO: 84 K/UL (ref 150–450)
PMV BLD AUTO: 10.9 FL (ref 9.4–12.3)
PMV BLD AUTO: 11.2 FL (ref 9.4–12.3)
PMV BLD AUTO: 11.4 FL (ref 9.4–12.3)
PMV BLD AUTO: 11.9 FL (ref 9.4–12.3)
PMV BLD AUTO: 11.9 FL (ref 9.4–12.3)
PMV BLD AUTO: 12.2 FL (ref 9.4–12.3)
PMV BLD AUTO: 12.4 FL (ref 9.4–12.3)
PO2 BLDV: 73 MMHG
POTASSIUM SERPL-SCNC: 4 MMOL/L (ref 3.5–5.1)
POTASSIUM SERPL-SCNC: 4.2 MMOL/L (ref 3.5–5.1)
POTASSIUM SERPL-SCNC: 4.3 MMOL/L (ref 3.5–5.1)
POTASSIUM SERPL-SCNC: 4.6 MMOL/L (ref 3.5–5.1)
POTASSIUM SERPL-SCNC: 4.7 MMOL/L (ref 3.5–5.1)
POTASSIUM SERPL-SCNC: 4.9 MMOL/L (ref 3.5–5.1)
POTASSIUM SERPL-SCNC: 5 MMOL/L (ref 3.5–5.1)
POTASSIUM SERPL-SCNC: 5.1 MMOL/L (ref 3.5–5.1)
POTASSIUM SERPL-SCNC: 5.1 MMOL/L (ref 3.5–5.1)
POTASSIUM SERPL-SCNC: 5.4 MMOL/L (ref 3.5–5.1)
PPD POC: NEGATIVE NEGATIVE
PPD POC: NEGATIVE NEGATIVE
PREALB SERPL-MCNC: 6.86 MG/DL (ref 18–35.7)
PROT SERPL-MCNC: 5 G/DL (ref 6.3–8.2)
PROT SERPL-MCNC: 5 G/DL (ref 6.3–8.2)
PROT SERPL-MCNC: 5.1 G/DL (ref 6.3–8.2)
PROT SERPL-MCNC: 5.2 G/DL (ref 6.3–8.2)
PROT SERPL-MCNC: 6 G/DL (ref 6.3–8.2)
PROT SERPL-MCNC: 6.2 G/DL (ref 6.3–8.2)
PROT SERPL-MCNC: 6.7 G/DL (ref 6.3–8.2)
PROT UR STRIP-MCNC: NEGATIVE MG/DL
PROTHROMBIN TIME: 16.9 SEC (ref 12.5–14.7)
Q-T INTERVAL, ECG07: 302 MS
Q-T INTERVAL, ECG07: 320 MS
QRS DURATION, ECG06: 70 MS
QRS DURATION, ECG06: 76 MS
QTC CALCULATION (BEZET), ECG08: 417 MS
QTC CALCULATION (BEZET), ECG08: 435 MS
RBC # BLD AUTO: 2.73 M/UL (ref 4.23–5.6)
RBC # BLD AUTO: 2.85 M/UL (ref 4.23–5.6)
RBC # BLD AUTO: 2.88 M/UL (ref 4.23–5.6)
RBC # BLD AUTO: 2.9 M/UL (ref 4.23–5.6)
RBC # BLD AUTO: 3.1 M/UL (ref 4.23–5.6)
RBC # BLD AUTO: 3.12 M/UL (ref 4.23–5.6)
RBC # BLD AUTO: 3.26 M/UL (ref 4.23–5.6)
RBC #/AREA URNS HPF: ABNORMAL /HPF
SAO2 % BLDV: 96.8 % (ref 65–88)
SARS-COV-2, COV2: NOT DETECTED
SERVICE CMNT-IMP: ABNORMAL
SODIUM SERPL-SCNC: 126 MMOL/L (ref 138–145)
SODIUM SERPL-SCNC: 126 MMOL/L (ref 138–145)
SODIUM SERPL-SCNC: 127 MMOL/L (ref 138–145)
SODIUM SERPL-SCNC: 128 MMOL/L (ref 136–145)
SODIUM SERPL-SCNC: 128 MMOL/L (ref 136–145)
SODIUM SERPL-SCNC: 128 MMOL/L (ref 138–145)
SODIUM SERPL-SCNC: 129 MMOL/L (ref 138–145)
SODIUM SERPL-SCNC: 130 MMOL/L (ref 136–145)
SODIUM SERPL-SCNC: 130 MMOL/L (ref 136–145)
SODIUM SERPL-SCNC: 131 MMOL/L (ref 136–145)
SODIUM SERPL-SCNC: 131 MMOL/L (ref 138–145)
SODIUM UR-SCNC: 43 MMOL/L
SOURCE, COVRS: NORMAL
SP GR UR REFRACTOMETRY: 1.02 (ref 1–1.02)
SPECIMEN EXP DATE BLD: NORMAL
SPECIMEN SOURCE, FCOV2M: NORMAL
SPECIMEN TYPE: ABNORMAL
T4 FREE SERPL-MCNC: 1.2 NG/DL (ref 0.78–1.46)
TOTAL RESP. RATE, ITRR: 20
TSH SERPL DL<=0.005 MIU/L-ACNC: 10.5 UIU/ML (ref 0.36–3.74)
UROBILINOGEN UR QL STRIP.AUTO: 1 EU/DL (ref 0.2–1)
VENTRICULAR RATE, ECG03: 111 BPM
VENTRICULAR RATE, ECG03: 115 BPM
WBC # BLD AUTO: 11.5 K/UL (ref 4.3–11.1)
WBC # BLD AUTO: 16.6 K/UL (ref 4.3–11.1)
WBC # BLD AUTO: 26.5 K/UL (ref 4.3–11.1)
WBC # BLD AUTO: 4.9 K/UL (ref 4.3–11.1)
WBC # BLD AUTO: 7.6 K/UL (ref 4.3–11.1)
WBC # BLD AUTO: 8.1 K/UL (ref 4.3–11.1)
WBC # BLD AUTO: 8.7 K/UL (ref 4.3–11.1)
WBC URNS QL MICRO: ABNORMAL /HPF

## 2021-01-01 PROCEDURE — P9047 ALBUMIN (HUMAN), 25%, 50ML: HCPCS | Performed by: RADIOLOGY

## 2021-01-01 PROCEDURE — 97161 PT EVAL LOW COMPLEX 20 MIN: CPT

## 2021-01-01 PROCEDURE — 74011250637 HC RX REV CODE- 250/637: Performed by: NURSE PRACTITIONER

## 2021-01-01 PROCEDURE — 36415 COLL VENOUS BLD VENIPUNCTURE: CPT

## 2021-01-01 PROCEDURE — 80048 BASIC METABOLIC PNL TOTAL CA: CPT

## 2021-01-01 PROCEDURE — 74011250637 HC RX REV CODE- 250/637: Performed by: INTERNAL MEDICINE

## 2021-01-01 PROCEDURE — 80053 COMPREHEN METABOLIC PANEL: CPT

## 2021-01-01 PROCEDURE — 83605 ASSAY OF LACTIC ACID: CPT

## 2021-01-01 PROCEDURE — 2709999900 HC NON-CHARGEABLE SUPPLY

## 2021-01-01 PROCEDURE — G0299 HHS/HOSPICE OF RN EA 15 MIN: HCPCS

## 2021-01-01 PROCEDURE — 74011250637 HC RX REV CODE- 250/637: Performed by: FAMILY MEDICINE

## 2021-01-01 PROCEDURE — 74011000250 HC RX REV CODE- 250: Performed by: NURSE ANESTHETIST, CERTIFIED REGISTERED

## 2021-01-01 PROCEDURE — 74011250636 HC RX REV CODE- 250/636: Performed by: PHYSICIAN ASSISTANT

## 2021-01-01 PROCEDURE — 85025 COMPLETE CBC W/AUTO DIFF WBC: CPT

## 2021-01-01 PROCEDURE — 0651 HSPC ROUTINE HOME CARE

## 2021-01-01 PROCEDURE — 74011000250 HC RX REV CODE- 250: Performed by: EMERGENCY MEDICINE

## 2021-01-01 PROCEDURE — 82565 ASSAY OF CREATININE: CPT

## 2021-01-01 PROCEDURE — 81003 URINALYSIS AUTO W/O SCOPE: CPT

## 2021-01-01 PROCEDURE — 65660000000 HC RM CCU STEPDOWN

## 2021-01-01 PROCEDURE — 76040000025: Performed by: INTERNAL MEDICINE

## 2021-01-01 PROCEDURE — 97166 OT EVAL MOD COMPLEX 45 MIN: CPT

## 2021-01-01 PROCEDURE — 77030040361 HC SLV COMPR DVT MDII -B

## 2021-01-01 PROCEDURE — 82140 ASSAY OF AMMONIA: CPT

## 2021-01-01 PROCEDURE — 74011250636 HC RX REV CODE- 250/636: Performed by: EMERGENCY MEDICINE

## 2021-01-01 PROCEDURE — 74011250636 HC RX REV CODE- 250/636: Performed by: FAMILY MEDICINE

## 2021-01-01 PROCEDURE — 74011636637 HC RX REV CODE- 636/637: Performed by: INTERNAL MEDICINE

## 2021-01-01 PROCEDURE — 75810000293 HC SIMP/SUPERF WND  RPR

## 2021-01-01 PROCEDURE — 85027 COMPLETE CBC AUTOMATED: CPT

## 2021-01-01 PROCEDURE — 49083 ABD PARACENTESIS W/IMAGING: CPT

## 2021-01-01 PROCEDURE — 74174 CTA ABD&PLVS W/CONTRAST: CPT

## 2021-01-01 PROCEDURE — 84134 ASSAY OF PREALBUMIN: CPT

## 2021-01-01 PROCEDURE — 77030014146 HC TY THORCENT/PARACENT BD -B

## 2021-01-01 PROCEDURE — 80076 HEPATIC FUNCTION PANEL: CPT

## 2021-01-01 PROCEDURE — 84439 ASSAY OF FREE THYROXINE: CPT

## 2021-01-01 PROCEDURE — 82270 OCCULT BLOOD FECES: CPT

## 2021-01-01 PROCEDURE — 99497 ADVNCD CARE PLAN 30 MIN: CPT | Performed by: INTERNAL MEDICINE

## 2021-01-01 PROCEDURE — HOSPICE MEDICATION HC HH HOSPICE MEDICATION

## 2021-01-01 PROCEDURE — 2709999900 HC NON-CHARGEABLE SUPPLY: Performed by: INTERNAL MEDICINE

## 2021-01-01 PROCEDURE — 85014 HEMATOCRIT: CPT

## 2021-01-01 PROCEDURE — G0155 HHCP-SVS OF CSW,EA 15 MIN: HCPCS

## 2021-01-01 PROCEDURE — 84132 ASSAY OF SERUM POTASSIUM: CPT

## 2021-01-01 PROCEDURE — 99285 EMERGENCY DEPT VISIT HI MDM: CPT

## 2021-01-01 PROCEDURE — 83935 ASSAY OF URINE OSMOLALITY: CPT

## 2021-01-01 PROCEDURE — 96375 TX/PRO/DX INJ NEW DRUG ADDON: CPT

## 2021-01-01 PROCEDURE — 99232 SBSQ HOSP IP/OBS MODERATE 35: CPT | Performed by: INTERNAL MEDICINE

## 2021-01-01 PROCEDURE — 99222 1ST HOSP IP/OBS MODERATE 55: CPT | Performed by: INTERNAL MEDICINE

## 2021-01-01 PROCEDURE — 86580 TB INTRADERMAL TEST: CPT | Performed by: FAMILY MEDICINE

## 2021-01-01 PROCEDURE — 83735 ASSAY OF MAGNESIUM: CPT

## 2021-01-01 PROCEDURE — 86901 BLOOD TYPING SEROLOGIC RH(D): CPT

## 2021-01-01 PROCEDURE — 74011000636 HC RX REV CODE- 636: Performed by: INTERNAL MEDICINE

## 2021-01-01 PROCEDURE — 74011000302 HC RX REV CODE- 302: Performed by: FAMILY MEDICINE

## 2021-01-01 PROCEDURE — 74011000250 HC RX REV CODE- 250: Performed by: INTERNAL MEDICINE

## 2021-01-01 PROCEDURE — C9113 INJ PANTOPRAZOLE SODIUM, VIA: HCPCS | Performed by: EMERGENCY MEDICINE

## 2021-01-01 PROCEDURE — 76060000031 HC ANESTHESIA FIRST 0.5 HR: Performed by: INTERNAL MEDICINE

## 2021-01-01 PROCEDURE — 77030040393 HC DRSG OPTIFOAM GENT MDII -B

## 2021-01-01 PROCEDURE — 75574 CT ANGIO HRT W/3D IMAGE: CPT

## 2021-01-01 PROCEDURE — 96361 HYDRATE IV INFUSION ADD-ON: CPT

## 2021-01-01 PROCEDURE — 84443 ASSAY THYROID STIM HORMONE: CPT

## 2021-01-01 PROCEDURE — U0005 INFEC AGEN DETEC AMPLI PROBE: HCPCS

## 2021-01-01 PROCEDURE — 74011000258 HC RX REV CODE- 258: Performed by: INTERNAL MEDICINE

## 2021-01-01 PROCEDURE — 74011250636 HC RX REV CODE- 250/636: Performed by: RADIOLOGY

## 2021-01-01 PROCEDURE — 70450 CT HEAD/BRAIN W/O DYE: CPT

## 2021-01-01 PROCEDURE — 97535 SELF CARE MNGMENT TRAINING: CPT

## 2021-01-01 PROCEDURE — 82962 GLUCOSE BLOOD TEST: CPT

## 2021-01-01 PROCEDURE — 3336500001 HSPC ELECTION

## 2021-01-01 PROCEDURE — 93005 ELECTROCARDIOGRAM TRACING: CPT | Performed by: EMERGENCY MEDICINE

## 2021-01-01 PROCEDURE — 76705 ECHO EXAM OF ABDOMEN: CPT

## 2021-01-01 PROCEDURE — C9113 INJ PANTOPRAZOLE SODIUM, VIA: HCPCS | Performed by: INTERNAL MEDICINE

## 2021-01-01 PROCEDURE — 77030010507 HC ADH SKN DERMBND J&J -B

## 2021-01-01 PROCEDURE — 76450000000

## 2021-01-01 PROCEDURE — 99233 SBSQ HOSP IP/OBS HIGH 50: CPT | Performed by: INTERNAL MEDICINE

## 2021-01-01 PROCEDURE — 87635 SARS-COV-2 COVID-19 AMP PRB: CPT

## 2021-01-01 PROCEDURE — 85610 PROTHROMBIN TIME: CPT

## 2021-01-01 PROCEDURE — 84300 ASSAY OF URINE SODIUM: CPT

## 2021-01-01 PROCEDURE — 93005 ELECTROCARDIOGRAM TRACING: CPT | Performed by: INTERNAL MEDICINE

## 2021-01-01 PROCEDURE — 74011250636 HC RX REV CODE- 250/636: Performed by: NURSE ANESTHETIST, CERTIFIED REGISTERED

## 2021-01-01 PROCEDURE — 85018 HEMOGLOBIN: CPT

## 2021-01-01 PROCEDURE — P9047 ALBUMIN (HUMAN), 25%, 50ML: HCPCS | Performed by: PHYSICIAN ASSISTANT

## 2021-01-01 PROCEDURE — 0DJ08ZZ INSPECTION OF UPPER INTESTINAL TRACT, VIA NATURAL OR ARTIFICIAL OPENING ENDOSCOPIC: ICD-10-PCS | Performed by: INTERNAL MEDICINE

## 2021-01-01 PROCEDURE — 71046 X-RAY EXAM CHEST 2 VIEWS: CPT

## 2021-01-01 PROCEDURE — 3331090004 HSPC SERVICE INTENSITY ADD-ON

## 2021-01-01 PROCEDURE — 96374 THER/PROPH/DIAG INJ IV PUSH: CPT

## 2021-01-01 PROCEDURE — 82803 BLOOD GASES ANY COMBINATION: CPT

## 2021-01-01 PROCEDURE — 74011250636 HC RX REV CODE- 250/636: Performed by: INTERNAL MEDICINE

## 2021-01-01 RX ORDER — FUROSEMIDE 40 MG/1
40 TABLET ORAL DAILY
Status: DISCONTINUED | OUTPATIENT
Start: 2021-01-01 | End: 2021-01-01 | Stop reason: HOSPADM

## 2021-01-01 RX ORDER — ACETAMINOPHEN 325 MG/1
650 TABLET ORAL ONCE
Status: COMPLETED | OUTPATIENT
Start: 2021-01-01 | End: 2021-01-01

## 2021-01-01 RX ORDER — TRAMADOL HYDROCHLORIDE 50 MG/1
50 TABLET ORAL
Status: DISCONTINUED | OUTPATIENT
Start: 2021-01-01 | End: 2021-01-01 | Stop reason: HOSPADM

## 2021-01-01 RX ORDER — DEXTROSE 50 % IN WATER (D50W) INTRAVENOUS SYRINGE
25 ONCE
Status: COMPLETED | OUTPATIENT
Start: 2021-01-01 | End: 2021-01-01

## 2021-01-01 RX ORDER — PROPOFOL 10 MG/ML
INJECTION, EMULSION INTRAVENOUS AS NEEDED
Status: DISCONTINUED | OUTPATIENT
Start: 2021-01-01 | End: 2021-01-01 | Stop reason: HOSPADM

## 2021-01-01 RX ORDER — ETOMIDATE 2 MG/ML
INJECTION INTRAVENOUS AS NEEDED
Status: DISCONTINUED | OUTPATIENT
Start: 2021-01-01 | End: 2021-01-01 | Stop reason: HOSPADM

## 2021-01-01 RX ORDER — LIDOCAINE HYDROCHLORIDE 20 MG/ML
INJECTION, SOLUTION EPIDURAL; INFILTRATION; INTRACAUDAL; PERINEURAL AS NEEDED
Status: DISCONTINUED | OUTPATIENT
Start: 2021-01-01 | End: 2021-01-01 | Stop reason: HOSPADM

## 2021-01-01 RX ORDER — PANTOPRAZOLE SODIUM 40 MG/1
40 TABLET, DELAYED RELEASE ORAL
Qty: 30 TABLET | Refills: 0 | Status: SHIPPED
Start: 2021-01-01 | End: 2021-01-01

## 2021-01-01 RX ORDER — ALBUMIN HUMAN 250 G/1000ML
12.5 SOLUTION INTRAVENOUS ONCE
Status: COMPLETED | OUTPATIENT
Start: 2021-01-01 | End: 2021-01-01

## 2021-01-01 RX ORDER — SPIRONOLACTONE 25 MG/1
25 TABLET ORAL DAILY
Status: DISCONTINUED | OUTPATIENT
Start: 2021-01-01 | End: 2021-01-01 | Stop reason: HOSPADM

## 2021-01-01 RX ORDER — CALCIUM GLUCONATE 94 MG/ML
1 INJECTION, SOLUTION INTRAVENOUS ONCE
Status: COMPLETED | OUTPATIENT
Start: 2021-01-01 | End: 2021-01-01

## 2021-01-01 RX ORDER — ONDANSETRON 2 MG/ML
4 INJECTION INTRAMUSCULAR; INTRAVENOUS
Status: DISCONTINUED | OUTPATIENT
Start: 2021-01-01 | End: 2021-01-01 | Stop reason: HOSPADM

## 2021-01-01 RX ORDER — PROPRANOLOL HYDROCHLORIDE 10 MG/1
20 TABLET ORAL 3 TIMES DAILY
Status: DISCONTINUED | OUTPATIENT
Start: 2021-01-01 | End: 2021-01-01 | Stop reason: HOSPADM

## 2021-01-01 RX ORDER — SODIUM CHLORIDE 0.9 % (FLUSH) 0.9 %
5-40 SYRINGE (ML) INJECTION EVERY 8 HOURS
Status: DISCONTINUED | OUTPATIENT
Start: 2021-01-01 | End: 2021-01-01 | Stop reason: HOSPADM

## 2021-01-01 RX ORDER — PANTOPRAZOLE SODIUM 40 MG/1
40 TABLET, DELAYED RELEASE ORAL
Status: DISCONTINUED | OUTPATIENT
Start: 2021-01-01 | End: 2021-01-01 | Stop reason: HOSPADM

## 2021-01-01 RX ORDER — SODIUM CHLORIDE, SODIUM LACTATE, POTASSIUM CHLORIDE, CALCIUM CHLORIDE 600; 310; 30; 20 MG/100ML; MG/100ML; MG/100ML; MG/100ML
INJECTION, SOLUTION INTRAVENOUS
Status: DISCONTINUED | OUTPATIENT
Start: 2021-01-01 | End: 2021-01-01 | Stop reason: HOSPADM

## 2021-01-01 RX ORDER — MORPHINE SULFATE 2 MG/ML
2 INJECTION, SOLUTION INTRAMUSCULAR; INTRAVENOUS
Status: DISCONTINUED | OUTPATIENT
Start: 2021-01-01 | End: 2021-01-01 | Stop reason: HOSPADM

## 2021-01-01 RX ORDER — NALOXONE HYDROCHLORIDE 0.4 MG/ML
0.4 INJECTION, SOLUTION INTRAMUSCULAR; INTRAVENOUS; SUBCUTANEOUS ONCE
Status: COMPLETED | OUTPATIENT
Start: 2021-01-01 | End: 2021-01-01

## 2021-01-01 RX ORDER — DEXTROSE MONOHYDRATE 100 MG/ML
20 INJECTION, SOLUTION INTRAVENOUS CONTINUOUS
Status: DISCONTINUED | OUTPATIENT
Start: 2021-01-01 | End: 2021-01-01

## 2021-01-01 RX ORDER — PROPRANOLOL HYDROCHLORIDE 20 MG/1
20 TABLET ORAL 3 TIMES DAILY
Qty: 45 TABLET | Refills: 0 | Status: SHIPPED
Start: 2021-01-01 | End: 2021-01-01

## 2021-01-01 RX ORDER — ALBUMIN HUMAN 250 G/1000ML
25 SOLUTION INTRAVENOUS ONCE
Status: COMPLETED | OUTPATIENT
Start: 2021-01-01 | End: 2021-01-01

## 2021-01-01 RX ORDER — SODIUM CHLORIDE 0.9 % (FLUSH) 0.9 %
10 SYRINGE (ML) INJECTION
Status: COMPLETED | OUTPATIENT
Start: 2021-01-01 | End: 2021-01-01

## 2021-01-01 RX ORDER — SODIUM CHLORIDE 0.9 % (FLUSH) 0.9 %
5-40 SYRINGE (ML) INJECTION AS NEEDED
Status: DISCONTINUED | OUTPATIENT
Start: 2021-01-01 | End: 2021-01-01 | Stop reason: HOSPADM

## 2021-01-01 RX ADMIN — DEXTROSE MONOHYDRATE 20 ML/HR: 10 INJECTION, SOLUTION INTRAVENOUS at 15:48

## 2021-01-01 RX ADMIN — LACTULOSE 45 ML: 20 SOLUTION ORAL at 08:34

## 2021-01-01 RX ADMIN — FUROSEMIDE 40 MG: 40 TABLET ORAL at 09:27

## 2021-01-01 RX ADMIN — LACTULOSE 30 ML: 20 SOLUTION ORAL at 20:47

## 2021-01-01 RX ADMIN — PANTOPRAZOLE SODIUM 40 MG: 40 TABLET, DELAYED RELEASE ORAL at 05:11

## 2021-01-01 RX ADMIN — PROPRANOLOL HYDROCHLORIDE 20 MG: 10 TABLET ORAL at 21:28

## 2021-01-01 RX ADMIN — ALBUMIN (HUMAN) 12.5 G: 0.25 INJECTION, SOLUTION INTRAVENOUS at 10:25

## 2021-01-01 RX ADMIN — Medication 125 MCG: at 09:30

## 2021-01-01 RX ADMIN — PROPRANOLOL HYDROCHLORIDE 20 MG: 10 TABLET ORAL at 08:33

## 2021-01-01 RX ADMIN — PROPRANOLOL HYDROCHLORIDE 20 MG: 10 TABLET ORAL at 13:42

## 2021-01-01 RX ADMIN — LORAZEPAM 1 MG: 1 TABLET ORAL at 09:30

## 2021-01-01 RX ADMIN — Medication 10 ML: at 05:29

## 2021-01-01 RX ADMIN — Medication 10 ML: at 14:12

## 2021-01-01 RX ADMIN — TRAMADOL HYDROCHLORIDE 50 MG: 50 TABLET ORAL at 19:02

## 2021-01-01 RX ADMIN — PROPRANOLOL HYDROCHLORIDE 20 MG: 10 TABLET ORAL at 15:40

## 2021-01-01 RX ADMIN — LACTULOSE 45 ML: 20 SOLUTION ORAL at 09:27

## 2021-01-01 RX ADMIN — Medication 10 ML: at 05:46

## 2021-01-01 RX ADMIN — Medication 10 ML: at 13:27

## 2021-01-01 RX ADMIN — NALXONE HYDROCHLORIDE 0.4 MG: 0.4 INJECTION INTRAMUSCULAR; INTRAVENOUS; SUBCUTANEOUS at 21:05

## 2021-01-01 RX ADMIN — FUROSEMIDE 40 MG: 40 TABLET ORAL at 10:32

## 2021-01-01 RX ADMIN — CALCIUM GLUCONATE 1 G: 98 INJECTION, SOLUTION INTRAVENOUS at 11:57

## 2021-01-01 RX ADMIN — ACETAMINOPHEN 650 MG: 325 TABLET ORAL at 06:28

## 2021-01-01 RX ADMIN — LACTULOSE 45 ML: 20 SOLUTION ORAL at 09:40

## 2021-01-01 RX ADMIN — Medication 10 ML: at 05:10

## 2021-01-01 RX ADMIN — Medication 10 ML: at 13:52

## 2021-01-01 RX ADMIN — LACTULOSE 45 ML: 20 SOLUTION ORAL at 21:11

## 2021-01-01 RX ADMIN — LACTULOSE 30 ML: 20 SOLUTION ORAL at 09:12

## 2021-01-01 RX ADMIN — PROPOFOL 30 MG: 10 INJECTION, EMULSION INTRAVENOUS at 11:08

## 2021-01-01 RX ADMIN — SODIUM CHLORIDE 100 ML: 900 INJECTION, SOLUTION INTRAVENOUS at 09:36

## 2021-01-01 RX ADMIN — Medication 10 ML: at 14:00

## 2021-01-01 RX ADMIN — FUROSEMIDE 40 MG: 40 TABLET ORAL at 10:16

## 2021-01-01 RX ADMIN — SPIRONOLACTONE 25 MG: 25 TABLET ORAL at 09:27

## 2021-01-01 RX ADMIN — LIDOCAINE HYDROCHLORIDE 100 MG: 20 INJECTION, SOLUTION EPIDURAL; INFILTRATION; INTRACAUDAL; PERINEURAL at 11:08

## 2021-01-01 RX ADMIN — LACTULOSE 45 ML: 20 SOLUTION ORAL at 13:52

## 2021-01-01 RX ADMIN — ALBUMIN (HUMAN) 12.5 G: 0.25 INJECTION, SOLUTION INTRAVENOUS at 08:44

## 2021-01-01 RX ADMIN — Medication 10 ML: at 04:59

## 2021-01-01 RX ADMIN — FUROSEMIDE 40 MG: 40 TABLET ORAL at 08:33

## 2021-01-01 RX ADMIN — Medication 10 ML: at 21:28

## 2021-01-01 RX ADMIN — Medication 10 ML: at 06:16

## 2021-01-01 RX ADMIN — LACTULOSE 30 ML: 20 SOLUTION ORAL at 10:16

## 2021-01-01 RX ADMIN — Medication 10 ML: at 15:42

## 2021-01-01 RX ADMIN — FUROSEMIDE 40 MG: 40 TABLET ORAL at 09:24

## 2021-01-01 RX ADMIN — LACTULOSE 30 ML: 20 SOLUTION ORAL at 15:16

## 2021-01-01 RX ADMIN — Medication 20 ML: at 15:28

## 2021-01-01 RX ADMIN — LACTULOSE 30 ML: 20 SOLUTION ORAL at 17:26

## 2021-01-01 RX ADMIN — LACTULOSE 30 ML: 20 SOLUTION ORAL at 15:40

## 2021-01-01 RX ADMIN — LACTULOSE 30 ML: 20 SOLUTION ORAL at 10:32

## 2021-01-01 RX ADMIN — PROPRANOLOL HYDROCHLORIDE 20 MG: 10 TABLET ORAL at 22:19

## 2021-01-01 RX ADMIN — Medication 10 ML: at 06:44

## 2021-01-01 RX ADMIN — FUROSEMIDE 40 MG: 40 TABLET ORAL at 08:56

## 2021-01-01 RX ADMIN — LACTULOSE 45 ML: 20 SOLUTION ORAL at 21:49

## 2021-01-01 RX ADMIN — PANTOPRAZOLE SODIUM 40 MG: 40 TABLET, DELAYED RELEASE ORAL at 04:59

## 2021-01-01 RX ADMIN — PROPRANOLOL HYDROCHLORIDE 20 MG: 10 TABLET ORAL at 21:29

## 2021-01-01 RX ADMIN — LACTULOSE 30 ML: 20 SOLUTION ORAL at 22:43

## 2021-01-01 RX ADMIN — LACTULOSE 45 ML: 20 SOLUTION ORAL at 08:55

## 2021-01-01 RX ADMIN — ALBUMIN (HUMAN) 25 G: 0.25 INJECTION, SOLUTION INTRAVENOUS at 10:23

## 2021-01-01 RX ADMIN — Medication 10 ML: at 06:37

## 2021-01-01 RX ADMIN — LACTULOSE 45 ML: 20 SOLUTION ORAL at 21:28

## 2021-01-01 RX ADMIN — Medication 10 ML: at 21:29

## 2021-01-01 RX ADMIN — PROPRANOLOL HYDROCHLORIDE 20 MG: 10 TABLET ORAL at 16:00

## 2021-01-01 RX ADMIN — LACTULOSE 45 ML: 20 SOLUTION ORAL at 17:17

## 2021-01-01 RX ADMIN — Medication 10 ML: at 21:49

## 2021-01-01 RX ADMIN — SPIRONOLACTONE 25 MG: 25 TABLET ORAL at 08:33

## 2021-01-01 RX ADMIN — PROPRANOLOL HYDROCHLORIDE 20 MG: 10 TABLET ORAL at 15:33

## 2021-01-01 RX ADMIN — LACTULOSE 45 ML: 20 SOLUTION ORAL at 15:46

## 2021-01-01 RX ADMIN — PANTOPRAZOLE SODIUM 40 MG: 40 TABLET, DELAYED RELEASE ORAL at 08:34

## 2021-01-01 RX ADMIN — SPIRONOLACTONE 25 MG: 25 TABLET ORAL at 10:32

## 2021-01-01 RX ADMIN — PROPRANOLOL HYDROCHLORIDE 20 MG: 10 TABLET ORAL at 17:13

## 2021-01-01 RX ADMIN — Medication 10 ML: at 23:53

## 2021-01-01 RX ADMIN — TUBERCULIN PURIFIED PROTEIN DERIVATIVE 5 UNITS: 5 INJECTION, SOLUTION INTRADERMAL at 15:56

## 2021-01-01 RX ADMIN — LACTULOSE 30 ML: 20 SOLUTION ORAL at 15:43

## 2021-01-01 RX ADMIN — SPIRONOLACTONE 25 MG: 25 TABLET ORAL at 10:16

## 2021-01-01 RX ADMIN — SODIUM CHLORIDE 500 ML: 900 INJECTION, SOLUTION INTRAVENOUS at 11:55

## 2021-01-01 RX ADMIN — PANTOPRAZOLE SODIUM 40 MG: 40 INJECTION, POWDER, FOR SOLUTION INTRAVENOUS at 02:45

## 2021-01-01 RX ADMIN — LACTULOSE 30 ML: 20 SOLUTION ORAL at 21:29

## 2021-01-01 RX ADMIN — Medication 10 ML: at 22:00

## 2021-01-01 RX ADMIN — PANTOPRAZOLE SODIUM 40 MG: 40 TABLET, DELAYED RELEASE ORAL at 05:52

## 2021-01-01 RX ADMIN — LACTULOSE 30 ML: 20 SOLUTION ORAL at 17:13

## 2021-01-01 RX ADMIN — LACTULOSE 45 ML: 20 SOLUTION ORAL at 12:39

## 2021-01-01 RX ADMIN — SPIRONOLACTONE 25 MG: 25 TABLET ORAL at 09:03

## 2021-01-01 RX ADMIN — Medication 10 ML: at 09:36

## 2021-01-01 RX ADMIN — INSULIN HUMAN 10 UNITS: 100 INJECTION, SOLUTION PARENTERAL at 12:24

## 2021-01-01 RX ADMIN — ETOMIDATE 2 MG: 2 INJECTION, SOLUTION INTRAVENOUS at 11:10

## 2021-01-01 RX ADMIN — FUROSEMIDE 40 MG: 40 TABLET ORAL at 09:03

## 2021-01-01 RX ADMIN — SPIRONOLACTONE 25 MG: 25 TABLET ORAL at 08:56

## 2021-01-01 RX ADMIN — LACTULOSE 45 ML: 20 SOLUTION ORAL at 13:42

## 2021-01-01 RX ADMIN — FUROSEMIDE 40 MG: 40 TABLET ORAL at 08:31

## 2021-01-01 RX ADMIN — PROPOFOL 10 MG: 10 INJECTION, EMULSION INTRAVENOUS at 11:10

## 2021-01-01 RX ADMIN — Medication 10 ML: at 22:19

## 2021-01-01 RX ADMIN — ETOMIDATE 6 MG: 2 INJECTION, SOLUTION INTRAVENOUS at 11:08

## 2021-01-01 RX ADMIN — PROPRANOLOL HYDROCHLORIDE 20 MG: 10 TABLET ORAL at 17:17

## 2021-01-01 RX ADMIN — PROPRANOLOL HYDROCHLORIDE 20 MG: 10 TABLET ORAL at 10:16

## 2021-01-01 RX ADMIN — IOPAMIDOL 100 ML: 755 INJECTION, SOLUTION INTRAVENOUS at 09:36

## 2021-01-01 RX ADMIN — Medication 10 ML: at 18:30

## 2021-01-01 RX ADMIN — LACTULOSE 30 ML: 20 SOLUTION ORAL at 09:03

## 2021-01-01 RX ADMIN — SODIUM CHLORIDE, SODIUM LACTATE, POTASSIUM CHLORIDE, AND CALCIUM CHLORIDE: 600; 310; 30; 20 INJECTION, SOLUTION INTRAVENOUS at 11:02

## 2021-01-01 RX ADMIN — LACTULOSE 30 ML: 20 SOLUTION ORAL at 21:45

## 2021-01-01 RX ADMIN — PHENYLEPHRINE HYDROCHLORIDE 100 MCG: 10 INJECTION INTRAVENOUS at 11:08

## 2021-01-01 RX ADMIN — Medication 10 ML: at 05:40

## 2021-01-01 RX ADMIN — LACTULOSE 30 ML: 20 SOLUTION ORAL at 09:23

## 2021-01-01 RX ADMIN — PANTOPRAZOLE SODIUM 40 MG: 40 TABLET, DELAYED RELEASE ORAL at 06:37

## 2021-01-01 RX ADMIN — PANTOPRAZOLE SODIUM 40 MG: 40 INJECTION, POWDER, FOR SOLUTION INTRAVENOUS at 11:56

## 2021-01-01 RX ADMIN — PANTOPRAZOLE SODIUM 40 MG: 40 TABLET, DELAYED RELEASE ORAL at 05:30

## 2021-01-01 RX ADMIN — PANTOPRAZOLE SODIUM 40 MG: 40 TABLET, DELAYED RELEASE ORAL at 06:16

## 2021-01-01 RX ADMIN — SPIRONOLACTONE 25 MG: 25 TABLET ORAL at 09:24

## 2021-01-01 RX ADMIN — DEXTROSE MONOHYDRATE 25 G: 25 INJECTION, SOLUTION INTRAVENOUS at 12:24

## 2021-01-01 RX ADMIN — PANTOPRAZOLE SODIUM 40 MG: 40 TABLET, DELAYED RELEASE ORAL at 06:44

## 2021-01-01 RX ADMIN — TRAMADOL HYDROCHLORIDE 50 MG: 50 TABLET ORAL at 17:22

## 2021-01-01 RX ADMIN — Medication 10 ML: at 21:48

## 2021-01-01 RX ADMIN — TRAMADOL HYDROCHLORIDE 50 MG: 50 TABLET ORAL at 17:13

## 2021-01-01 RX ADMIN — LACTULOSE 30 ML: 20 SOLUTION ORAL at 15:34

## 2021-01-01 RX ADMIN — SPIRONOLACTONE 25 MG: 25 TABLET ORAL at 08:31

## 2021-01-01 RX ADMIN — Medication 10 ML: at 22:03

## 2021-01-01 RX ADMIN — PROPRANOLOL HYDROCHLORIDE 20 MG: 10 TABLET ORAL at 21:45

## 2021-01-01 RX ADMIN — LACTULOSE 45 ML: 20 SOLUTION ORAL at 17:08

## 2021-01-01 RX ADMIN — PANTOPRAZOLE SODIUM 40 MG: 40 TABLET, DELAYED RELEASE ORAL at 05:47

## 2021-01-04 NOTE — TELEPHONE ENCOUNTER
Spoke with pt regarding TAVR CT for 1/11 @0945 with arrival @0915. Instructed pt to enter hospital on the outpatient side, go to 2nd floor, and check in at radiology. No food 4 hr prior and nothing to drink 2 hr prior except for sips of water with medications. Instructed to hold lasix on 1/11 am and Glucophage pt takes in the pm, to hold 1/10 pm. Pt verbalized understanding and contact information ([phone]) provided for any further questions.     Marci Rodriguez, Structural Heart Navigator

## 2021-01-11 NOTE — NURSE NAVIGATOR
Educational information/pamphlet provided to the pt regarding aortic stenosis and treatment options (SAVR and TAVR). Also explained that CT will be to evaluate pt for potential TAVR. Contact information provided for any further questions.     Albert Barcenas, Structural Heart Navigator

## 2021-03-31 NOTE — DISCHARGE INSTRUCTIONS
[address]  Department of Interventional Radiology  [facility]  [phone] Office  [phone] Fax    PARACENTESIS DISCHARGE INSTRUCTIONS    General Information:  During this procedure, the doctor will insert a needle into the abdomen to drain fluid. After the procedure, you will be able to take a deep breath much easier. The site of the puncture may ooze the first day. This will decrease and eventually stop. Paracentesis (draining fluid from the abdomen) sometimes makes patients hypotensive (low blood pressure). Your doctor may order for you to receive fluids or albumin (a volume booster) during the procedure through an IV site. Home Care Instructions:  Keep the puncture site clean and dry. No tub baths or swimming until puncture site heals. Showering is acceptable. Resume your normal diet, and resume your normal activity slowly and as you tolerate. If you are short of breath, rest. If shortness of breath does not ease, please call your ordering doctor. Fluid can re-accumulate in the chest and/or in the abdomen. If this should occur, your doctor needs to know as you may need to have the procedure done again. Call If:     You should call your Physician and/or the Radiology Nurse if you notice any signs of infection, like pus draining, or if it is swollen or reddened. Also call if you have a fever, or if you are bleeding from the puncture site more than a small amount on the dressing. Call if the puncture site keeps draining fluid. Some oozing is to be expected, but should slow and then stop. Call if you feel like you have pressure in your abdomen. SEEK IMMEDIATE CARE OR CALL 911 IF YOU SUDDENLY HAVE TROUBLE BREATHING, OR IF YOUR LIPS TURN BLUE, OR IF YOU NOTICE BLOOD IN YOUR SPUTUM. Follow-Up Instructions: Please see your ordering doctor as he/she has requested. To Reach Us:   Interventional Radiology General Nurse Discharge    After general anesthesia or intravenous sedation, for 24 hours or while taking prescription Narcotics:  · Limit your activities  · Do not drive and operate hazardous machinery  · Do not make important personal or business decisions  · Do  not drink alcoholic beverages  · If you have not urinated within 8 hours after discharge, please contact your surgeon on call. * Please give a list of your current medications to your Primary Care Provider. * Please update this list whenever your medications are discontinued, doses are     changed, or new medications (including over-the-counter products) are added. * Please carry medication information at all times in case of emergency situations. These are general instructions for a healthy lifestyle:    No smoking/ No tobacco products/ Avoid exposure to second hand smoke  Surgeon General's Warning:  Quitting smoking now greatly reduces serious risk to your health. Obesity, smoking, and sedentary lifestyle greatly increases your risk for illness  A healthy diet, regular physical exercise & weight monitoring are important for maintaining a healthy lifestyle    You may be retaining fluid if you have a history of heart failure or if you experience any of the following symptoms:  Weight gain of 3 pounds or more overnight or 5 pounds in a week, increased swelling in our hands or feet or shortness of breath while lying flat in bed. Please call your doctor as soon as you notice any of these symptoms; do not wait until your next office visit. Recognize signs and symptoms of STROKE:  F-face looks uneven    A-arms unable to move or move unevenly    S-speech slurred or non-existent    T-time-call 911 as soon as signs and symptoms begin-DO NOT go       Back to bed or wait to see if you get better-TIME IS BRAIN. If you have any questions about your procedure, please call the Interventional Radiology department at [phone].  After business hours (5pm) and weekends, call the answering service at [phone] and ask for the Radiologist on call to be paged.           Date: 3/31/2021  Discharging Nurse: Ky Gowers, RN

## 2021-03-31 NOTE — PROGRESS NOTES
Interventional Radiology Post Paracentesis/Thoracentesis Note    3/31/2021    Procedure(s): Ultrasound guided Therapeutic Paracentesis Performed with 8 Cuban catheter total volume 6700 ml. Samples sent to lab. Preliminary Findings: large. Complications: None    Plan:  Observation, check labs if drawn.           Chest X-Ray:  no    Full dictated report to follow

## 2021-05-04 NOTE — PROGRESS NOTES
Interventional Radiology Post Paracentesis/Thoracentesis Note    5/4/2021    Procedure(s): Ultrasound guided Therapeutic Paracentesis Performed with 8 Irish catheter total volume 30812 ml. .    Preliminary Findings: large clear. Complications: None    Plan:  Observation, check labs if drawn.           Chest X-Ray:  no    Full dictated report to follow

## 2021-05-04 NOTE — PROCEDURES
Department of Interventional Radiology  [phone]        Interventional Radiology Brief Procedure Note    Patient: Mariana Van MRN: [id number]  SSN: [id number]    YOB: 1945  Age: 70 y.o. Sex: male      Date of Procedure: 5/4/2021    Pre-Procedure Diagnosis: Recurrent ascites. Post-Procedure Diagnosis: SAME    Procedure(s): Paracentesis    Brief Description of Procedure: as above    Performed By: Karoline Vera MD     Assistants: None    Anesthesia:Lidocaine    Estimated Blood Loss: None    Specimens:  None    Implants:  None    Findings: Large volume ascites.      Complications: None    Recommendations: None     Follow Up: None    Signed By: Karoline Vera MD     May 4, 2021

## 2021-05-04 NOTE — DISCHARGE INSTRUCTIONS
[address]  Department of Interventional Radiology  [facility]  [phone] Office  [phone] Fax    PARACENTESIS DISCHARGE INSTRUCTIONS    General Information:  During this procedure, the doctor will insert a needle into the abdomen to drain fluid. After the procedure, you will be able to take a deep breath much easier. The site of the puncture may ooze the first day. This will decrease and eventually stop. Paracentesis (draining fluid from the abdomen) sometimes makes patients hypotensive (low blood pressure). Your doctor may order for you to receive fluids or albumin (a volume booster) during the procedure through an IV site. Home Care Instructions:  Keep the puncture site clean and dry. No tub baths or swimming until puncture site heals. Showering is acceptable. Resume your normal diet, and resume your normal activity slowly and as you tolerate. If you are short of breath, rest. If shortness of breath does not ease, please call your ordering doctor. Fluid can re-accumulate in the chest and/or in the abdomen. If this should occur, your doctor needs to know as you may need to have the procedure done again. Call If:     You should call your Physician and/or the Radiology Nurse if you notice any signs of infection, like pus draining, or if it is swollen or reddened. Also call if you have a fever, or if you are bleeding from the puncture site more than a small amount on the dressing. Call if the puncture site keeps draining fluid. Some oozing is to be expected, but should slow and then stop. Call if you feel like you have pressure in your abdomen. SEEK IMMEDIATE CARE OR CALL 911 IF YOU SUDDENLY HAVE TROUBLE BREATHING, OR IF YOUR LIPS TURN BLUE, OR IF YOU NOTICE BLOOD IN YOUR SPUTUM. Follow-Up Instructions: Please see your ordering doctor as he/she has requested. To Reach Us:   Interventional Radiology General Nurse Discharge    After general anesthesia or intravenous sedation, for 24 hours or while taking prescription Narcotics:  · Limit your activities  · Do not drive and operate hazardous machinery  · Do not make important personal or business decisions  · Do  not drink alcoholic beverages  · If you have not urinated within 8 hours after discharge, please contact your surgeon on call. * Please give a list of your current medications to your Primary Care Provider. * Please update this list whenever your medications are discontinued, doses are     changed, or new medications (including over-the-counter products) are added. * Please carry medication information at all times in case of emergency situations. These are general instructions for a healthy lifestyle:    No smoking/ No tobacco products/ Avoid exposure to second hand smoke  Surgeon General's Warning:  Quitting smoking now greatly reduces serious risk to your health. Obesity, smoking, and sedentary lifestyle greatly increases your risk for illness  A healthy diet, regular physical exercise & weight monitoring are important for maintaining a healthy lifestyle    You may be retaining fluid if you have a history of heart failure or if you experience any of the following symptoms:  Weight gain of 3 pounds or more overnight or 5 pounds in a week, increased swelling in our hands or feet or shortness of breath while lying flat in bed. Please call your doctor as soon as you notice any of these symptoms; do not wait until your next office visit. Recognize signs and symptoms of STROKE:  F-face looks uneven    A-arms unable to move or move unevenly    S-speech slurred or non-existent    T-time-call 911 as soon as signs and symptoms begin-DO NOT go       Back to bed or wait to see if you get better-TIME IS BRAIN. If you have any questions about your procedure, please call the Interventional Radiology department at [phone].  After business hours (5pm) and weekends, call the answering service at [phone] and ask for the Radiologist on call to be paged.            Date: 5/4/2021  Discharging Nurse: Lauren Lawson RN

## 2021-05-18 PROBLEM — E87.5 HYPERKALEMIA: Status: ACTIVE | Noted: 2021-01-01

## 2021-05-18 PROBLEM — K76.82 ACUTE HEPATIC ENCEPHALOPATHY: Status: ACTIVE | Noted: 2021-01-01

## 2021-05-18 PROBLEM — K92.2 GIB (GASTROINTESTINAL BLEEDING): Status: ACTIVE | Noted: 2021-01-01

## 2021-05-18 NOTE — ED TRIAGE NOTES
Pt arrives per EMS from home. Family called for \"hemmorage\" in the toilet. Family states it was a lot. Pt denies. Pt is A&Ox4 but weak and tired. Pt told EMS blood from big toe lac. Recently had 11 L drained off abdomen. Daughter Jose Voss [phone] said to call with any questions. Not eating or drinking well recently. Abdomen distended but denies pain.

## 2021-05-18 NOTE — ED PROVIDER NOTES
Patient is a 33-year-old male with a known history of cirrhosis presenting to the emergency department today complaining of fatigue. The patient says that his daughter saw blood around the toilet and was concerned he was bleeding rectally but he says he just stubbed his toe and it bleeding was coming from there. The patient however does not have any bleeding or wound on his feet. The patient denies any abdominal pain chest pain or shortness of breath. Is not a fevers or chills. Past Medical History:  
Diagnosis Date  Anemia 9/19/2012  Carotid body tumor ([facility]  Chronic kidney disease   
 patient denies any kidney disease  Chronic kidney disease, stage III (moderate) ([facility] 9/19/2012  DM2 (diabetes mellitus, type 2) ([facility] 9/19/2012 A1C 6.4 1/10/2019- avg 120- denies hypo- takes oral meds only  HLD (hyperlipidemia) 9/19/2012  
 HTN (hypertension) 9/19/2012  
 medication controlled Past Surgical History:  
Procedure Laterality Date  HX COLONOSCOPY    
 IR PARACENTESIS ABD W IMAGE  3/31/2021  IR PARACENTESIS ABD W IMAGE  5/4/2021 Family History:  
Problem Relation Age of Onset  Hypertension Daughter  Heart Disease Mother  Cancer Neg Hx Social History Socioeconomic History  Marital status:  Spouse name: Not on file  Number of children: Not on file  Years of education: Not on file  Highest education level: Not on file Occupational History  Not on file Social Needs  Financial resource strain: Not on file  Food insecurity Worry: Not on file Inability: Not on file  Transportation needs Medical: Not on file Non-medical: Not on file Tobacco Use  Smoking status: Never Smoker  Smokeless tobacco: Never Used Substance and Sexual Activity  Alcohol use: No  
 Drug use: No  
 Sexual activity: Not on file Lifestyle  Physical activity Days per week: Not on file   Minutes per session: Not on file  Stress: Not on file Relationships  Social connections Talks on phone: Not on file Gets together: Not on file Attends Latter day service: Not on file Active member of club or organization: Not on file Attends meetings of clubs or organizations: Not on file Relationship status: Not on file  Intimate partner violence Fear of current or ex partner: Not on file Emotionally abused: Not on file Physically abused: Not on file Forced sexual activity: Not on file Other Topics Concern  Not on file Social History Narrative  Not on file ALLERGIES: Actos [pioglitazone] Review of Systems Constitutional: Positive for fatigue. Gastrointestinal: Positive for blood in stool. Skin: Positive for wound. All other systems reviewed and are negative. Vitals:  
 05/18/21 1054 BP: 121/67 Pulse: (!) 116 Resp: 18 Temp: 98.4 °F (36.9 °C) SpO2: 98% Weight: 78.9 kg (174 lb) Height: 5' 10\" (1.778 m) Physical Exam  
 
GENERAL:The patient has Body mass index is 24.97 kg/m². Well-hydrated. VITAL SIGNS: Heart rate, blood pressure, respiratory rate reviewed as recorded in 
nurse's notes EYES: Pupils reactive. Extraocular motion intact. No conjunctival redness or drainage. MOUTH/THROAT: Pharynx clear; airway patent. NECK: Supple, no meningeal signs. Trachea midline. No masses or thyromegaly. LUNGS: Breath sounds clear and equal bilaterally no accessory muscle use CHEST: No deformity CARDIOVASCULAR: Regular rate and rhythm ABDOMEN: Soft without tenderness. No palpable masses or organomegaly. No 
peritoneal signs. No rigidity. Large umbilical hernia and bilateral inguinal hernias present. All are easily reducible. RECTAL: No gross blood noted but fecal occult blood positive. EXTREMITIES: No clubbing or cyanosis. No joint swelling. Normal muscle tone. No 
restricted range of motion appreciated.  
NEUROLOGIC: Sensation is grossly intact. Cranial nerve exam reveals face is 
symmetrical, tongue is midline speech is clear. SKIN: No rash or petechiae. Good skin turgor palpated. Scab on the patient's umbilical hernia with bright red blood. Please see photo PSYCHIATRIC: Alert and oriented. Appropriate behavior and judgment. MDM Number of Diagnoses or Management Options Diagnosis management comments: Viral infection, gastroenteritis, viral adenitis, pseudomembranous colitis, inflammatory 
bowel disease, infectious diarrhea Abdominal wall pain,  
 
Constipation, fecal impaction, small bowel obstruction, partial small bowel obstruction, 
Ileus UTI, pyelonephritis, renal colic, ureteral stone Peptic ulcer disease, esophagitis, GERD Pancreatitis, pancreatic pseudocyst, 
 
hepatic cirrhosis, GI bleed, esophageal varices, poisoning, 
 
gallbladder disease, cholecystitis, diverticulitis, appendicitis, appendicitis with rupture, 
 
ingestion of foreign material 
 
 
  
Amount and/or Complexity of Data Reviewed Clinical lab tests: reviewed and ordered Tests in the radiology section of CPT®: ordered and reviewed Tests in the medicine section of CPT®: ordered and reviewed Independent visualization of images, tracings, or specimens: yes ED Course as of May 18 1158 Tue May 18, 2021  
1143 IMPRESSION 
  
1. Pulmonary venous congestion with probable mild interstitial pulmonary edema. 2. Atherosclerosis. XR CHEST PA LAT [KH] 1143 Lactic acid(!): 2.8 [KH] 1143 Ammonia(!): 47 [KH] 1143 Sodium(!): 126 [KH] 1143 Potassium(!): 5.4 [KH] 1144 Creatinine(!): 2.03 [KH] 1144 BUN(!): 66 [KH]  I spoke with the patients daughter and updated her regarding the findings and the need for admission to the hospital.  She is agreeable with this and thankful. Cleo Gusman3 GI was consulted regarding the patient's thrombocytopenia GI bleed. Cleo Pastor The hospitalist has been consulted regarding the need for admission. Katrin Moorener ED Course User Index [KH] Angela Patrick DO Wound Closure by Adhesive Date/Time: 5/18/2021 10:58 AM 
Performed by: Angela Patrick DO Authorized by: Angela Patrick DO Consent:  
  Consent obtained:  Verbal 
  Consent given by:  Patient Risks discussed:  Pain, infection, need for additional repair, poor wound healing and retained foreign body Alternatives discussed:  No treatment and observation Anesthesia (see MAR for exact dosages): Anesthesia method:  None Laceration details: Location:  Trunk Trunk location: Umbilical hernia. Length (cm):  0.7 Repair type:  
  Repair type:  Simple Exploration:  
  Hemostasis achieved with:  Direct pressure Contaminated: no   
Treatment:  
  Area cleansed with:  Hibiclens Amount of cleaning:  Standard Visualized foreign bodies/material removed: no   
Skin repair:  
  Repair method:  Tissue adhesive Approximation:  
  Approximation:  Loose Post-procedure details:  
  Patient tolerance of procedure: Tolerated well, no immediate complications EKG Date/Time: 5/18/2021 11:01 AM 
Performed by: Angela Patrick DO Authorized by: Angela Patrick DO  
 
ECG reviewed by ED Physician in the absence of a cardiologist: yes Comments:  
   EKG at a rate of 115 bpm.  The patient has peaked T waves in V2 with a narrow QRS complex. Critical Care Performed by: Angela Patrick DO Authorized by: Angela Patrick DO Comments:  
   Critical care time: 79 minutes of critical care time was performed in the emergency department. This was separate from any other procedures listed during the patients emergency department course. The failure to initiate these interventions on an urgent basis would likely have resulted in sudden, clinically significant or life-threatening deterioration in the patients condition.

## 2021-05-18 NOTE — CONSULTS
[facility] Consult Note Primary GI Physician: Dr. Nico Moore Referring Provider:  Dr. Anjana Timmons Consult Date:  5/18/2021 Admit Date:  5/18/2021 Chief Complaint:  Heme +, thrombocytopenia Subjective:  
 
History of Present Illness:  Patient is a 70 y.o. male with PMH including but not limited to DM II, CKD, HTN, HLD, aortic stenosis , who is seen in consultation at the request of Dr. Anjana Timmons for heme + stool and thrombocytopenia. Mr. Dora Catalan presented to the ED on 5/18 with complaints of fatigue and blood in toilet. He has been found to have a bleeding abdominal hernia. His step-daughter is in the ER with him today. She reports that he had a large volume of bright red blood in the toilet today. Patient is not aware of any GI bleeding, in the past. He tells me that his toe was bleeding. He denies any abdominal pain. He complains of several week hx of nausea without vomiting. He did vomit on the way to the hospital and denies hematemesis/coffee ground emesis. He denies taking any NSAIDs. He reports feeling \"tired\" recently. His step-daughter, reports that he has been confused and tired. He has never been on medications for HE. Ammonia is elevated, in the ER. HGB is stable at 11.1, MCV 95, , INR not checked, TB 2.4, AST 40, ALt 36, . Mr. Dora Catalan follows with Dr. Nico Moore for history of NAFLD cirrhosis and ascites. He was last seen in the office 4/23/2021 and scheduled for EGD to band varices, ,RUQ US to screen for [address] paracentesis, and referral to transplant hepatology. He called the office on 5/11 and cancelled the [address] [facility] due to his wife having surgery. His EGD was also cancelled. His last EGD was 4/16/2019 with findings of mild portal gastropathy, erosive gastritis, duodenitis. He has not had a colonoscopy with us. Paracentesis was performed on 5/4/2021: 10,900 ml removed. He has been taking tylenol as needed for back pain, 2 tabs at bedtime.   
 
Patient has reported that his last colonoscopy was in 2017, when seen in our office. We do not have a copy of that on file. PMH: 
Past Medical History:  
Diagnosis Date  Anemia 9/19/2012  Carotid body tumor ([facility] [address]  Chronic kidney disease   
 patient denies any kidney disease  Chronic kidney disease, stage III (moderate) ([facility] 9/19/2012  DM2 (diabetes mellitus, type 2) ([facility] [address] 9/19/2012 A1C 6.4 1/10/2019- avg 120- denies hypo- takes oral meds only  HLD (hyperlipidemia) 9/19/2012  
 HTN (hypertension) 9/19/2012  
 medication controlled PSH: 
Past Surgical History:  
Procedure Laterality Date  HX COLONOSCOPY    
 IR PARACENTESIS ABD W IMAGE  3/31/2021  IR PARACENTESIS ABD W IMAGE  5/4/2021 Allergies: Allergies Allergen Reactions  Actos [Pioglitazone] Other (comments) Stops urinating Home Medications: 
Prior to Admission medications Medication Sig Start Date End Date Taking? Authorizing Provider  
ciprofloxacin HCl (CILOXAN) 0.3 % ophthalmic solution  5/5/21   Provider, Historical  
erythromycin (ILOTYCIN) ophthalmic ointment  5/8/21   Provider, Historical  
blood sugar diagnostic (ONETOUCH ULTRA TEST) by Does Not Apply route. Provider, Historical  
glucose blood VI test strips (OneTouch Ultra Blue Test Strip) strip Testing daily as directed, E11.9 5/4/21   Katt Elliott MD OneTouch Delica Lancets 30 gauge misc Testing daily as directed, E11.9 5/4/21   Katt Elliott MD  
OneTouch Ultra2 Meter misc Testing daily as directed, E11.9 5/4/21   Jocelyn Mendoza MD  
valACYclovir (VALTREX) 1 gram tablet Take 1 Tab by mouth three (3) times daily. 5/3/21   Rama Barbosa MD  
predniSONE (DELTASONE) 20 mg tablet Take 20 mg by mouth three (3) times daily. 5/3/21   Rama Barbosa MD  
rOPINIRole (Requip) 0.25 mg tablet Take 1 Tab by mouth nightly. 4/28/21   Rama Barbosa MD  
furosemide (Lasix) 40 mg tablet Take 1 Tab by mouth every other day.  
Patient taking differently: Take 40 mg by mouth daily. 2/17/21   Jeanna Aleman MD  
minocycline (DYNACIN) 100 mg tablet Take 1 Tab by mouth daily. 2/17/21   Jeanna Aleman MD  
metFORMIN (GLUCOPHAGE) 500 mg tablet TAKE 1 TABLET BY MOUTH TWICE DAILY WITH MEALS Patient taking differently: Take 500 mg by mouth two (2) times daily (with meals). 1/23/21   Jeanna Aleman MD  
spironolactone (ALDACTONE) 50 mg tablet Take 1 Tab by mouth two (2) times a day. 12/3/20   Jeanna Aleman MD  
ascorbic acid (VITAMIN C PO) Take  by mouth. Provider, Historical  
simvastatin (ZOCOR) 20 mg tablet TAKE 1 TABLET BY MOUTH NIGHTLY 6/28/20   Jeanna Aleman MD  
metFORMIN (GLUCOPHAGE) 500 mg tablet Take 1 Tab by mouth two (2) times daily (with meals). Patient taking differently: Take 500 mg by mouth daily (with breakfast). 10/7/19   Jeanna Aleman MD  
multivit with minerals/lutein (MULTIVITAMIN 50 PLUS PO) Take 1 Tab by mouth daily. Provider, Historical  
cholecalciferol (VITAMIN D3) 2,000 unit cap capsule Take 2,000 Units by mouth daily. Provider, Historical  
magnesium oxide (MAG-OX) 400 mg tablet Take 400 mg by mouth daily. Provider, Historical  
 
 
Hospital Medications: 
Current Facility-Administered Medications Medication Dose Route Frequency  sodium chloride 0.9 % bolus infusion 500 mL  500 mL IntraVENous ONCE  
 dextrose (D50W) injection syrg 25 g  25 g IntraVENous ONCE  
 insulin regular (NOVOLIN R, HUMULIN R) injection 10 Units  10 Units IntraVENous ONCE Current Outpatient Medications Medication Sig  ciprofloxacin HCl (CILOXAN) 0.3 % ophthalmic solution  erythromycin (ILOTYCIN) ophthalmic ointment  blood sugar diagnostic (ONETOUCH ULTRA TEST) by Does Not Apply route.  glucose blood VI test strips (OneTouch Ultra Blue Test Strip) strip Testing daily as directed, O58.7  
 OneTouch Delica Lancets 30 gauge misc Testing daily as directed, E11.9  
 OneTouch Ultra2 Meter misc Testing daily as directed, E11.9  valACYclovir (VALTREX) 1 gram tablet Take 1 Tab by mouth three (3) times daily.  predniSONE (DELTASONE) 20 mg tablet Take 20 mg by mouth three (3) times daily.  rOPINIRole (Requip) 0.25 mg tablet Take 1 Tab by mouth nightly.  furosemide (Lasix) 40 mg tablet Take 1 Tab by mouth every other day. (Patient taking differently: Take 40 mg by mouth daily.)  minocycline (DYNACIN) 100 mg tablet Take 1 Tab by mouth daily.  metFORMIN (GLUCOPHAGE) 500 mg tablet TAKE 1 TABLET BY MOUTH TWICE DAILY WITH MEALS (Patient taking differently: Take 500 mg by mouth two (2) times daily (with meals). )  spironolactone (ALDACTONE) 50 mg tablet Take 1 Tab by mouth two (2) times a day.  ascorbic acid (VITAMIN C PO) Take  by mouth.  simvastatin (ZOCOR) 20 mg tablet TAKE 1 TABLET BY MOUTH NIGHTLY  multivit with minerals/lutein (MULTIVITAMIN 50 PLUS PO) Take 1 Tab by mouth daily.  cholecalciferol (VITAMIN D3) 2,000 unit cap capsule Take 2,000 Units by mouth daily.  magnesium oxide (MAG-OX) 400 mg tablet Take 400 mg by mouth daily. Social History: 
Social History Tobacco Use  Smoking status: Never Smoker  Smokeless tobacco: Never Used Substance Use Topics  Alcohol use: No  
 
 
Pt denies any history of drug use, blood transfusions, or tattoos. Family History: 
Family History Problem Relation Age of Onset  Hypertension Daughter  Heart Disease Mother  Cancer Neg Hx Review of Systems: A detailed 10 system ROS is obtained, with pertinent positives as listed above. All others are negative. Diet:  NPO Objective:  
 
Physical Exam: 
Vitals: 
Visit Vitals /70 Pulse (!) 116 Temp 98.4 °F (36.9 °C) Resp 18 Ht 5' 10\" (1.778 m) Wt 78.9 kg (174 lb) SpO2 99% BMI 24.97 kg/m² Gen:  Pt is alert, cooperative, no acute distress, laying on a stretcher in the ED, muscle wasting. Skin:  Extremities and face reveal no rashes. HEENT: Sclerae Icteric. Extra-occular muscles are intact. No oral ulcers. No abnormal pigmentation of the lips. The neck is supple. + spider angiomas Cardiovascular: Regular rate and rhythm. + Murmur Respiratory:  Comfortable breathing with no accessory muscle use. Clear breath sounds anteriorly with no wheezes, rales, or rhonchi. GI:  Abdomen distended, soft, and nontender. Periumbilical hernia is present and oozing BRB from an open sore. Normal active bowel sounds. No enlargement of the liver or spleen. No masses palpable. Rectal:  Deferred Musculoskeletal:  No pitting edema of the lower legs. Neurological:  Gross memory appears intact. Patient is alert and oriented. NEGATIVE ASTERIXIS. Psychiatric:  Mood appears appropriate with judgement intact. Lymphatic:  No cervical or supraclavicular adenopathy. Laboratory:   
Recent Labs 05/18/21 
1032 WBC 8.1 HGB 11.1*  
HCT 31.1*  
* MCV 95.4 *  
K 5.4* CL 96* CO2 22 BUN 66* CREA 2.03* CA 8.9 MG 2.2 *  AST 40* ALT 36  
TBILI 2.4* ALB 3.0*  
TP 6.2* Interventional Radiology Brief Procedure Note 
  
Patient: Candi Begum MRN: [id number]  SSN: [id number] YOB: 1945  Age: 70 y.o. Sex: male   
  
Date of Procedure: 5/4/2021 
  
Pre-Procedure Diagnosis: Recurrent ascites.  
  
Post-Procedure Diagnosis: SAME 
  
Procedure(s): Paracentesis 
  
Brief Description of Procedure: as above 
  
Performed By: Sho Tinoco MD  
  
Assistants: None 
  
Anesthesia:Lidocaine 
  
Estimated Blood Loss: None 
  
Specimens:  None 
  
Implants:  None 
  
Findings: Large volume ascites.  
  
Complications: None 
  
Recommendations: None  
  
Follow Up: None 
  
Signed By: Sho Tinoco MD   
  May 4, 2021   
 
 
  
5/4/2021 
  
Procedure(s): Ultrasound guided Therapeutic Paracentesis Performed with 8 [place] catheter total volume 05098 ml. . 
 
 
 
Assessment:  
 
Active Problems:   * No active hospital problems. * 
 Patient is a 70 y.o. male with PMH including but not limited to DM II, CKD, HTN, HLD, aortic stenosis , who is seen in consultation at the request of Dr. Luba Greenwood for heme + stool and thrombocytopenia. Mr. Pato Fay follows with Dr. Maya Stuart for history of NAFLD cirrhosis and ascites. EGD in 2019 with PHG, no varices. He was recently scheduled for screening EGD but this had to be cancelled due to family situation. He is overdue for screening US for [address] He is having nausea daily. Had some blood in the toilet bowl which the patient reports is from a toe laceration. He is found to have a bleeding periumbilical hernia on exam. DANIELLA without blood but FOBT was positive. Labs are stable. He has had some confusion, lethargy, and has an elevated ammonia level. Plan:  
 
RUQ US to further evaluate the nausea and cover [address] screening Antiemetics PRN Follow labs daily EGD to evaluate for possible PUD, gastritis, Esophagitis, EV. Plan for EGD on 5/19. Continue with PPI IV BID Check INR Start on lactulose for HE 
NPO after midnight Careful titration of diuretics with elevated creatinine level Low Na diet, when not NPO Risk of procedure discussed with patient to include risk of infection, bleeding, perforation, sedation reaction, exacerbation of underlying medical conditions, and death. Patient voiced understanding and wishes to plan for procedure when appropriate. Miranda Truong, JENNIFER Patient is seen and examined in collaboration with Dr. Alon De La Paz. Assessment and plan as per Dr. Alon De La Paz.

## 2021-05-18 NOTE — H&P
[facility] Service History and Physical 
 
Patient ID: 
Annie Weiss 
male 1945 
[id number] Admission Date: 5/18/2021 Chief Complaint: GI bleed and altered mental status Reason for Admission: GI bleed and hepatic encephalopathy ASSESSMENT & PLAN: 
 
GI bleed 
-Patient has a positive fecal occult 
-Stool is not grossly black 
-We will monitor H&H and transfuse as appropriate 
-GI consulted 
-Continue IV Protonix 
-EGD per them 
-N.p.o. for now 
-Serial H&H 
-Transfuse for any hemoglobin less than 7 Hepatic encephalopathy 
-We will give lactulose 
-Monitor his ammonia levels  
-He is nonfocal. 
-Low threshold for CT head and further indicated. Hyperkalemia 
-He got calcium gluconate in the ER we will continue him on remote telemetry at this time 
-  
-We will also give dextrose and insulin 
-Continue dextrose in setting of n.p.o. status secondary to GI bleed with plans for possible EGD in the a.m. 
-Repeat potassium levels today Bleeding from umbilicus 
-This was Dermabond in the ER 
-Likely the source of significant bleeding per them 
-We will monitor Cirrhosis of the liver 
-Continue appropriate medical management 
-GI input appreciated 
-They plan to get the right upper quadrant ultrasound to check the patient's gallbladder that he canceled and refused to have done per family. Will also be appropriate for hepatocellular's screening Continue other appropriate home medications Further work-up and management based on his clinical course Disposition: Admit to the medicine remote telemetry unit Diet: N.p.o.  
VTE ppx: SCDs in the setting of a GI bleed GI ppx: IV Protonix CODE STATUS: Full Surrogate decision-maker: Wife Debby Chun at [phone] HISTORY OF PRESENT ILLNESS: 
Patient is a 70 y.o. male with medical h/o significant for cirrhosis and varices. He had recent paracentesis on the fourth of this year.   He was scheduled to have an outpatient right upper quadrant ultrasound and liver biopsy but canceled these procedures secondary to his wife being in the hospital and would not reschedule them for his family secondary to fear about what would have been found. His daughter noted blood in the toilet today as well as confusion and had him brought to the ER for further evaluation and treatment. The patient was found to be fecal occult blood positive but stool was not grossly consistent with GI bleed. He was found to have an abrasion to his umbilicus that was bleeding profusely suspected to be secondary to the patient scratching. This was Dermabond did in the ER. He was also noted to have an elevated potassium level he has been given calcium gluconate. GI consulted for GI bleed and hepatic encephalopathy. Hospitalist consulted for admission to the hospital. 
 
Patient himself unable to give a full history at the time of my history taking and examination. No family picked up the phone when I called. History is taken from the ER physician and staff in the patient's chart. .  
 
 
Allergies Allergen Reactions  Actos [Pioglitazone] Other (comments) Stops urinating Prior to Admission Medications Prescriptions Last Dose Informant Patient Reported? Taking? OneTouch Delica Lancets 30 gauge misc   No No  
Sig: Testing daily as directed, E11.9 OneTouch Ultra2 Meter misc   No No  
Sig: Testing daily as directed, E11.9  
ascorbic acid (VITAMIN C PO)   Yes No  
Sig: Take  by mouth. blood sugar diagnostic (ONETOUCH ULTRA TEST)   Yes No  
Sig: by Does Not Apply route. cholecalciferol (VITAMIN D3) 2,000 unit cap capsule   Yes No  
Sig: Take 2,000 Units by mouth daily. ciprofloxacin HCl (CILOXAN) 0.3 % ophthalmic solution   Yes No  
erythromycin (ILOTYCIN) ophthalmic ointment   Yes No  
furosemide (Lasix) 40 mg tablet   No No  
Sig: Take 1 Tab by mouth every other day. Patient taking differently: Take 40 mg by mouth daily.   
glucose blood VI test strips (OneTouch Ultra Blue Test Strip) strip   No No  
Sig: Testing daily as directed, E11.9  
magnesium oxide (MAG-OX) 400 mg tablet   Yes No  
Sig: Take 400 mg by mouth daily. metFORMIN (GLUCOPHAGE) 500 mg tablet   No No  
Sig: TAKE 1 TABLET BY MOUTH TWICE DAILY WITH MEALS Patient taking differently: Take 500 mg by mouth two (2) times daily (with meals). minocycline (DYNACIN) 100 mg tablet   No No  
Sig: Take 1 Tab by mouth daily. multivit with minerals/lutein (MULTIVITAMIN 50 PLUS PO)   Yes No  
Sig: Take 1 Tab by mouth daily. predniSONE (DELTASONE) 20 mg tablet   No No  
Sig: Take 20 mg by mouth three (3) times daily. rOPINIRole (Requip) 0.25 mg tablet   No No  
Sig: Take 1 Tab by mouth nightly. simvastatin (ZOCOR) 20 mg tablet   No No  
Sig: TAKE 1 TABLET BY MOUTH NIGHTLY  
spironolactone (ALDACTONE) 50 mg tablet   No No  
Sig: Take 1 Tab by mouth two (2) times a day. valACYclovir (VALTREX) 1 gram tablet   No No  
Sig: Take 1 Tab by mouth three (3) times daily. Facility-Administered Medications: None Past Medical History:  
Diagnosis Date  Anemia 9/19/2012  Carotid body tumor ([facility] [address]  Chronic kidney disease   
 patient denies any kidney disease  Chronic kidney disease, stage III (moderate) ([facility] [address] 9/19/2012  DM2 (diabetes mellitus, type 2) ([facility] [address] 9/19/2012 A1C 6.4 1/10/2019- avg 120- denies hypo- takes oral meds only  HLD (hyperlipidemia) 9/19/2012  
 HTN (hypertension) 9/19/2012  
 medication controlled Past Surgical History:  
Procedure Laterality Date  HX COLONOSCOPY    
 IR PARACENTESIS ABD W IMAGE  3/31/2021  IR PARACENTESIS ABD W IMAGE  5/4/2021 Social History Tobacco Use  Smoking status: Never Smoker  Smokeless tobacco: Never Used Substance Use Topics  Alcohol use: No  
 
 
FAMILY HISTORY:   
Family History Problem Relation Age of Onset  Hypertension Daughter  Heart Disease Mother  Cancer Neg Hx REVIEW OF SYSTEMS: 
A 14 point review of systems was taken and pertinent positive as per HPI. PHYSICAL EXAM: 
 
Visit Vitals /67 (BP 1 Location: Left arm, BP Patient Position: At rest) Pulse (!) 117 Temp 98.4 °F (36.9 °C) Resp 18 Ht 5' 10\" (1.778 m) Wt 78.9 kg (174 lb) SpO2 100% BMI 24.97 kg/m² General: No acute distress, speaking in full sentences, no use of accessory muscles HEENT: Pupils equal and reactive to light and accommodation, oropharynx is clear Neck: Supple, no lymphadenopathy, no JVD Lungs: Clear to auscultation bilaterally Cardiovascular: Regular rate and rhythm with normal S1 and S2 Abdomen: Soft, nontender, distended, normoactive bowel sounds Extremities: No cyanosis clubbing or edema Neuro: Nonfocal, A&O x3.  But confused as to why he is in the hospital 
Psych: Normal mood and affect Intake/Output last 3 shifts: 
 
 
 
Labs: 
CMP:  
Lab Results Component Value Date/Time  (L) 05/18/2021 10:32 AM  
 K 5.4 (H) 05/18/2021 10:32 AM  
 CL 96 (L) 05/18/2021 10:32 AM  
 CO2 22 05/18/2021 10:32 AM  
 AGAP 8 05/18/2021 10:32 AM  
  (H) 05/18/2021 10:32 AM  
 BUN 66 (H) 05/18/2021 10:32 AM  
 CREA 2.03 (H) 05/18/2021 10:32 AM  
 GFRAA 42 (L) 05/18/2021 10:32 AM  
 GFRNA 34 (L) 05/18/2021 10:32 AM  
 CA 8.9 05/18/2021 10:32 AM  
 MG 2.2 05/18/2021 10:32 AM  
 ALB 3.0 (L) 05/18/2021 10:32 AM  
 TBILI 2.4 (H) 05/18/2021 10:32 AM  
 TP 6.2 (L) 05/18/2021 10:32 AM  
 GLOB 3.2 05/18/2021 10:32 AM  
 AGRAT 0.9 (L) 05/18/2021 10:32 AM  
 ALT 36 05/18/2021 10:32 AM  
 
 
 
CBC:   
Lab Results Component Value Date/Time WBC 8.1 05/18/2021 10:32 AM  
 HGB 11.1 (L) 05/18/2021 10:32 AM  
 HCT 31.1 (L) 05/18/2021 10:32 AM  
  (L) 05/18/2021 10:32 AM  
 
 
Lab Results Component Value Date/Time  INR 1.2 12/03/2020 10:13 AM  
 INR 1.2 02/10/2020 10:44 AM  
 INR 1.3 11/20/2019 09:51 AM  
 Prothrombin time 12.6 (H) 12/03/2020 10:13 AM  
 Prothrombin time 12.5 (H) 02/10/2020 10:44 AM  
 Prothrombin time 16.2 (H) 11/20/2019 09:51 AM  
 
 
ABG:  No results found for: PH, PHI, PCO2, PCO2I, PO2, PO2I, HCO3, HCO3I, FIO2, FIO2I No results found for: CPK, RCK1, RCK2, RCK3, RCK4, CKMB, CKNDX, CKND1, TROPT, TROIQ, BNPP, BNP Imaging & Other Studies: XR Results (maximum last 3): Results from [facility] [place] Encounter encounter on 05/18/21 XR CHEST PA LAT Narrative EXAM: XR CHEST PA LAT INDICATION: weakness COMPARISON: None FINDINGS: 
The cardiomediastinal silhouette is within normal limits. Atherosclerotic 
calcifications in the aorta. No pleural effusion or pneumothorax. Pulmonary 
venous congestion with suggestion of increased interstitial opacities with 
bibasilar predominance. No focal consolidation. Osseous structures are grossly 
intact with diffuse osteopenia. Impression 1. Pulmonary venous congestion with probable mild interstitial pulmonary edema. 2. Atherosclerosis. CT Results (maximum last 3): Results from [facility] Encounter encounter on 01/11/21 CTA CHEST ABD PELV W WO CONT Narrative Title:  CTA  of the chest, abdomen and pelvis. Indication:  Aortic stenosis. Center lifestyle. Diabetes hypercholesterolemia. Technique: Axial images of the chest, abdomen and pelvis were obtained before 
and after the administration of intravenous iodinated contrast media. Contrast 
was used to best identify solid structures. Images also viewed on an 
independent workstation including 3D rendering. All CT scans at this facility are performed using dose reduction/dose modulation 
techniques, as appropriate the performed exam, including the following: Automated Exposure Control; Adjustment of the mA and/or kV according to patient 
size (this includes techniques or standardized protocols for targeted exams 
where dose is matched to indication/reason for exam); and Use of Iterative Reconstruction Technique.  
 
Comparison: None 
 
Findings:  
 
Neck base: Normal  
Lungs: Normal 
Mediastinum: Normal. 
Cardiac: Coronary and valvular calcifications. Pulmonary Arteries:  Normal 
Esophagus: Normal 
CHEST WALL: Gynecomastia Liver:Cirrhotic appearance of the liver with no focal masses. Visualized portal 
vein is patent. Biliary:Gallstones. Pancreas:Normal 
Spleen:Enlarged. The splenic vein is patent. Adrenals:Normal 
Kidneys:Normal 
Lymph nodes:No pathologically enlarged lymph nodes Abdominal wall:Right inguinal hernia contains fat and a small amount of ascites 
fluid. There is also a periumbilical hernia. Bowel:No bowel distention. Moderate amount of ascites is present. Sigmoid 
diverticulosis. Pelvic organs: The urinary bladder is not well-distended. Bones:Mild degenerative changes Aorta:  Mild atherosclerosis of the thoracic aorta. Visualized great vessels are 
patent. There is also mild-to-moderate atherosclerotic changes in the abdominal 
aorta. Mesenteric vessels are patent. Vascular/Other:  Iliac arteries are patent without significant tortuosity. Impression Impression: 1. Mild atherosclerotic changes of the aorta without evidence of aneurysm or 
dissection. 2. Cirrhotic liver with CT evidence of portal hypertension. 3. Ascites. 4. Gallstones. 5.. Ana-Umbilical and inguinal hernias, as above CT CORONARY ART W CA+ W EF  
 Narrative CLINICAL INDICATION:  
Aortic stenosis; Preoperative planning for potential transcatheter aortic valve 
replacement (TAVR) procedure. COMPARISON:  
None TECHNIQUE:  
ECG synchronized CT of the aortic root, followed immediately by CT angiography 
of the thorax, abdominal and pelvic aorta, extending from the root of the neck 
to the inferior margins of the lesser trochanters of the femurs, was performed. Multiplanar, curved planar, and 3D images were rendered using advanced 
processing software and reviewed to further define anatomy and possible 
pathology.  A total of 119 mL of Isovue 370 intravenous contrast media was 
utilized in these examinations. FINDINGS:  
 
Coronary Angiography: Coronary arteries with extensive calcification and 
evidence of prior PCI. Not well visualized on this study. Aortic Annulus: The aortic annulus was visualized in the 25% phase interval. Motion artifact at 
annulus may limit measurement accuracy. Aortic Valve Calcium Score:  3090. Trileaflet Aortic Valve. Aortic Root Measurement (inner wall to inner wall, unless otherwise specified) Annulus area: 573.2 mm2 Annulus area derived diameter: 27.0 mm Annulus minimum diameter: 24.3 mm Annulus maximum diameter: 30.6 mm Annulus perimeter: 86.1 mm Calcification: Mild. LVOT area: 589.5 mm2. Calcification: MIld. Sinus of Valsalva height: 12.3 mm Sinus of Valsalva: L 33.1 mm, R 31.9 mm, N 33.4 mm Left main coronary height: 17.6 mm Right coronary height: 19.8 mm Sinotubular junction: 29.2 x 30.2 mm. Calcification: Minimal. 
Mid ascending aorta: 32.0 x 34.0 mm. Calcification: Mild. 3-Cusp view: FALCON 17 degrees; CRA 7 degrees Ancillary Findings:  
Left ventricular ejection fraction greater than 55% on evaluation of gated 
images. Nonobstructive calcified atherosclerosis of the visualized portions of 
the thoracic aorta. Mitral annular calcification. Abdominal ascites. Impression 1. TAVR measurements as detailed above. 2. A 29 mm Amin LUCINA 3 valve should be considered for placement. 3. Recommend coronary angiography if not already completed given limited 
visualization of the coronary arteries. 4. Left ventricular ejection fraction greater than 55% on evaluation of gated 
images 5. Abdominal ascites. 6. Please see separately dictated radiology reports for non cardiac CT findings 
and dedicated aortic and iliofemoral artery measurements. COMMUNICATION:  
Per this written report. Results from Hospital Encounter encounter on 06/06/17 CTA NECK Narrative History: Abnormal carotid ultrasound. Mass or lump in the neck. Comments: CT ANGIOGRAM OF THE NECK was obtained following the administration of 
IV contrast. IV contrast was administered to evaluate the arterial vasculature. Reformatted images in the coronal and sagittal planes as well as 3-D imaging was 
obtained and reviewed on a dedicated PACS and [address] Radiation 
reduction dose techniques were used for the study. Our CT scanner use one or all 
of the following- Automated exposure control, adjustment of the mA and/or KV 
according the patient size, iterative reconstruction. Findings: 
 
CT ANGIOGRAM OF THE NECK: 
 
There is classic arch anatomy. Mild atherosclerotic changes are noted of the 
neck and proximal great vessels however no significant disease is identified. Evaluation of the right carotid artery system demonstrates patency with no 
significant narrowing of the carotid bulb. Mild eccentric calcified plaque 
disease is noted. In the distal common carotid artery. 50% narrowing is noted 
due to noncalcified plaque. Evaluation left carotid artery system demonstrates patency. The carotid bulb is 
patulous measuring 11 mm. Mild eccentric calcified plaque disease is present. All measurements are based upon NASCET criteria. At the level of the carotid bulb there is an avidly enhancing mass which 
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 
with a paraganglioma/carotid body tumor. This measures 14 x 19 mm. Lung apices are clear. The thyroid gland is unremarkable. The vertebral arteries are patent. Significant stenoses is noted the origin on 
the right and moderate stenosis noted at the origin on the left. Mild right maxillary sinus disease and right ethmoid disease. Impression IMPRESSION: 
1. AVIDLY ENHANCING MASS AT THE LEFT CAROTID BULB WHICH SPLAYS THE INTERNAL AND EXTERNAL CAROTID ARTERIES.  THIS IS MOST CONSISTENT WITH A PARAGANGLIOMA (CAROTID 
BODY TUMOR). THESE TUMORS HAVE MALIGNANT POTENTIAL AND WOULD RECOMMEND SURGICAL EVALUATION FOR REMOVAL. 2. Vertebral artery stenosis. DC4 The significant findings in this report have been referred to the Imaging Navigator in order to communicate to the referring provider or his/her designee 
as outlined in Section II.C.2.a.ii-iii of the ACR Practice Guideline for Communication of Diagnostic Imaging Findings. MRI Results (maximum last 3): No results found for this or any previous visit. Nuclear Medicine Results (maximum last 3): No results found for this or any previous visit. US Results (maximum last 3): Results from [facility] [place]counter encounter on 08/02/18 US ABD COMP Narrative ABDOMINAL ULTRASOUND. HISTORY: Abnormal liver function tests. COMPARISON: None. FINDINGS: Pancreas is grossly unremarkable although the tail is partially 
obscured by overlying bowel gas. . Aorta is normal caliber, proximally 1.8 cm and 
obscured distally. The IVC is patent. The enlarged spleen has a homogenous 
echotexture and measures 17 cm. Left kidney is unremarkable without 
hydronephrosis and measures 12.4 cm. Right kidney is unremarkable without 
hydronephrosis and measures 10.4 cm. Renal echogenicity is normal, the right 
kidney is hypoechoic to the liver. The intrahepatic biliary tree is not dilated. There is hepatopetal flow in the portal vein. No gross focal parenchymal 
abnormality identified within the liver. There is a small amount of free fluid 
surrounding the liver. The gallbladder wall is borderline thickened. Multiple 
small echogenic gallstones. The common bile duct is not dilated 4 mm. Impression IMPRESSION: Small amount of ascites and splenomegaly, 17 cm. Cholelithiasis 
without biliary tree obstruction. No focal parenchymal liver lesion. DEXA Results (maximum last 3): No results found for this or any previous visit.  
 
ISABEL Results (maximum last 3): No results found for this or any previous visit. IR Results (maximum last 3): Results from [facility] Encounter encounter on 05/04/21 IR PARACENTESIS ABD W IMAGE Narrative PROCEDURE: Ultrasound-guided paracentesis Procedural Personnel Attending physician(s):  Gin Summers M.D. Pre-procedure diagnosis:  Recurrent ascites, abdominal distention, abdominal 
pain. Nonalcoholic cirrhosis. Renal insufficiency. Thrombocytopenia. Splenomegaly. Coronary artery disease. Mild hyperbilirubinemia. Aortic 
stenosis. Hypertension. Hyperlipidemia. Left ventricular ejection fraction = 
60-65%, left atrial dilation, severe aortic stenosis on 07/27/2020 
echocardiogram. 
Post-procedure diagnosis:  Same Indication:  Ascites with pain or pressure symptoms Additional clinical history:  CT 01/11/2021 Complications: No immediate complications. Impression Ultrasound-guided paracentesis with drainage of 10,900 mL of thin, 
yellow ascites fluid. Plan:  The patient is been discharged home in stable condition. Recommend 
placement of tunneled peritoneal drain catheter or peritoneal-venous shunt for 
the treatment of ascites. _______________________________________________________________ PROCEDURE SUMMARY: 
- Limited abdominal ultrasound - Ultrasound-guided paracentesis PROCEDURE DETAILS: 
 
Pre-procedure Consent:  Informed written and oral consent for the procedure was obtained after 
explanation of risks (including, but not limitted to:  hemorrhage, infection, 
visceral injury) benefits and alternatives. The patient's questions were 
answered to their satisfaction. They stated understanding and requested that we 
proceed. Final verification:  A time-out identifying the patient and planned procedure 
was performed prior to this procedure.    
 
Preparation:  Maximal sterile barrier technique (including: Sterile ultrasound 
probe cover, sterile ultrasound gel, cap, mask, sterile gloves, sterile sheet, 
hand hygiene, and cutaneous antisepsis) was used. Anesthesia/sedation Level of anesthesia/sedation:  No sedation Anesthesia/sedation administered by:  Not applicable Total intra-service sedation time (minutes):  Not applicable Initial abdominal ultrasound Initial abdominal ultrasound was performed. Findings:  Large volume ascites. A safe window for paracentesis was identified 
and utilized. Paracentesis Local anesthesia was administered. The peritoneal cavity was accessed and fluid 
return confirmed appropriate drain catheter position. Ascites was drained. The 
catheter was removed. Sterile glue was placed on the skin. Paracentesis access technique:  Real-time ultrasound guidance Catheter placed:  8-Icelandic straight, multiside hole catheter Post-drainage ultrasound:  Not performed Additional Details Additional description of procedure:  None Equipment details:  None Specimens removed:  Ascites fluid was not sent to the laboratory Estimated blood loss (mL):  Less than 10 Standardized report: [email or web address] Attestation Signer name: Valerio Hammans, M.D. I attest that I personally performed the entire procedure. I reviewed the stored 
images and agree with the report as written. Results from [facility] [place] on 03/31/21 IR PARACENTESIS ABD W IMAGE Narrative Title: Ultrasound guided paracentesis. History: 72-year-old male with ascites, nonalcoholic cirrhosis, renal 
insufficiency. :  Enrique Olson PA-C Supervising Physician: Casey Ivan. Guille Newby M.D. Consent:  Informed written and oral consent was obtained from the patient after 
the risks and benefits were discussed. All questions were answered and the 
patient requested that we proceed. Procedure:  Maximal sterile barrier technique was used.   Following routine prep 
and drape of the abdomen, a local field block with 1% lidocaine was achieved. Ultrasound evaluation was performed. Fluid was identified in the peritoneal cavity. Using real-time ultrasound 
guidance, the peritoneal cavity was accessed with an 8 British centesis catheter. The catheter was connected to wall suction. A total of 6-700 cc of thin yellow fluid was removed. The catheter was removed 
and a bandage applied. Complications: None. Findings: Large-volume ascites. Impression Uncomplicated ultrasound guided paracentesis. VAS/US Results (maximum last 3): No results found for this or any previous visit. PET Results (maximum last 3): No results found for this or any previous visit. EKG Results Procedure 720 Value Units Date/Time EKG, 12 LEAD, SUBSEQUENT [[id number]] Collected: 05/18/21 1224 Order Status: Completed Updated: 05/18/21 1236 Ventricular Rate 111 BPM   
  Atrial Rate 111 BPM   
  P-R Interval 160 ms QRS Duration 76 ms   
  Q-T Interval 320 ms QTC Calculation (Bezet) 435 ms Calculated P Axis 68 degrees Calculated R Axis 12 degrees Calculated T Axis 68 degrees Diagnosis --  
  !! AGE AND GENDER SPECIFIC ECG ANALYSIS !! Sinus tachycardia Otherwise normal ECG When compared with ECG of 18-MAY-2021 10:40, No significant change was found EKG 12 LEAD INITIAL [[id number]] Collected: 05/18/21 1040 Order Status: Completed Updated: 05/18/21 1235 Ventricular Rate 115 BPM   
  Atrial Rate 115 BPM   
  P-R Interval 156 ms QRS Duration 70 ms Q-T Interval 302 ms QTC Calculation (Bezet) 417 ms Calculated P Axis 61 degrees Calculated R Axis 2 degrees Calculated T Axis 79 degrees Diagnosis --  
  !! AGE AND GENDER SPECIFIC ECG ANALYSIS !! Sinus tachycardia Inferior infarct , age undetermined Abnormal ECG No previous ECGs available Active Problems: 
Patient Active Problem List  
 Diagnosis Date Noted  Hyperkalemia 05/18/2021  GIB (gastrointestinal bleeding) 05/18/2021  Acute hepatic encephalopathy 05/18/2021  Thrombocytopenia ([facility] [address] 12/03/2020  Other ascites 10/16/2019  Umbilical hernia without obstruction and without gangrene 06/10/2019  Cold intolerance 01/10/2019  Other cirrhosis of liver ([facility] [address] 08/28/2017  Vitamin D deficiency 08/28/2017  Nonrheumatic aortic valve stenosis 07/20/2017  Carotid body tumor ([facility] [address] 07/20/2017  DM2 (diabetes mellitus, type 2) ([facility] [address] 09/19/2012  
 HTN (hypertension) 09/19/2012  HLD (hyperlipidemia) 09/19/2012  Anemia 09/19/2012 Lisa Ruby MD 
[facility] Hospitalist Service 5/18/2021 12:37 PM

## 2021-05-18 NOTE — PROGRESS NOTES
TRANSFER - IN REPORT: 
 
Verbal report received from [facility] (name) on Gurvinder Face  being received from ED (unit) for routine progression of care Report consisted of patients Situation, Background, Assessment and  
Recommendations(SBAR). Information from the following report(s) SBAR, Kardex, ED Summary, Intake/Output, MAR and Recent Results was reviewed with the receiving nurse. Opportunity for questions and clarification was provided. Assessment completed upon patients arrival to unit and care assumed.

## 2021-05-18 NOTE — ED NOTES
TRANSFER - OUT REPORT: 
 
Verbal report given to [facility] AT THE [facility] RN (name) on Annie Weiss  being transferred to [facility] (unit) for routine progression of care Report consisted of patients Situation, Background, Assessment and  
Recommendations(SBAR). Information from the following report(s) SBAR was reviewed with the receiving nurse. Lines:  
Saline Lock 05/18/21 Left Antecubital (Active) Saline Lock 05/18/21 Right Antecubital (Active) Opportunity for questions and clarification was provided. Patient transported with: 
 [employer]

## 2021-05-18 NOTE — H&P (VIEW-ONLY)
[facility] Consult Note Primary GI Physician: Dr. Loretta Lea Referring Provider:  Dr. Osvaldo Campos Consult Date:  5/18/2021 Admit Date:  5/18/2021 Chief Complaint:  Heme +, thrombocytopenia Subjective:  
 
History of Present Illness:  Patient is a 70 y.o. male with PMH including but not limited to DM II, CKD, HTN, HLD, aortic stenosis , who is seen in consultation at the request of Dr. Osvaldo Campos for heme + stool and thrombocytopenia. Mr. Shiloh Cox presented to the ED on 5/18 with complaints of fatigue and blood in toilet. He has been found to have a bleeding abdominal hernia. His step-daughter is in the ER with him today. She reports that he had a large volume of bright red blood in the toilet today. Patient is not aware of any GI bleeding, in the past. He tells me that his toe was bleeding. He denies any abdominal pain. He complains of several week hx of nausea without vomiting. He did vomit on the way to the hospital and denies hematemesis/coffee ground emesis. He denies taking any NSAIDs. He reports feeling \"tired\" recently. His step-daughter, reports that he has been confused and tired. He has never been on medications for HE. Ammonia is elevated, in the ER. HGB is stable at 11.1, MCV 95, , INR not checked, TB 2.4, AST 40, ALt 36, . Mr. Shiloh Cox follows with Dr. Loretta Lea for history of NAFLD cirrhosis and ascites. He was last seen in the office 4/23/2021 and scheduled for EGD to band varices, ,RUQ US to screen for [address] paracentesis, and referral to transplant hepatology. He called the office on 5/11 and cancelled the [address] due to his wife having surgery. His EGD was also cancelled. His last EGD was 4/16/2019 with findings of mild portal gastropathy, erosive gastritis, duodenitis. He has not had a colonoscopy with us. Paracentesis was performed on 5/4/2021: 10,900 ml removed. He has been taking tylenol as needed for back pain, 2 tabs at bedtime.   
 
Patient has reported that his last colonoscopy was in 2017, when seen in our office. We do not have a copy of that on file. PMH: 
Past Medical History:  
Diagnosis Date  Anemia 9/19/2012  Carotid body tumor [address] [address]  Chronic kidney disease   
 patient denies any kidney disease  Chronic kidney disease, stage III (moderate) ([facility] [address] 9/19/2012  DM2 (diabetes mellitus, type 2) ([facility] [address] 9/19/2012 A1C 6.4 1/10/2019- avg 120- denies hypo- takes oral meds only  HLD (hyperlipidemia) 9/19/2012  
 HTN (hypertension) 9/19/2012  
 medication controlled PSH: 
Past Surgical History:  
Procedure Laterality Date  HX COLONOSCOPY    
 IR PARACENTESIS ABD W IMAGE  3/31/2021  IR PARACENTESIS ABD W IMAGE  5/4/2021 Allergies: Allergies Allergen Reactions  Actos [Pioglitazone] Other (comments) Stops urinating Home Medications: 
Prior to Admission medications Medication Sig Start Date End Date Taking? Authorizing Provider  
ciprofloxacin HCl (CILOXAN) 0.3 % ophthalmic solution  5/5/21   Provider, Historical  
erythromycin (ILOTYCIN) ophthalmic ointment  5/8/21   Provider, Historical  
blood sugar diagnostic (ONETOUCH ULTRA TEST) by Does Not Apply route. Provider, Historical  
glucose blood VI test strips (OneTouch Ultra Blue Test Strip) strip Testing daily as directed, E11.9 5/4/21   Asael Elliott MD  
OneTouch Delica Lancets 30 gauge misc Testing daily as directed, E11.9 5/4/21   Asael Elliott MD  
OneTouch Ultra2 Meter misc Testing daily as directed, E11.9 5/4/21   Nicci Mcdaniel MD  
valACYclovir (VALTREX) 1 gram tablet Take 1 Tab by mouth three (3) times daily. 5/3/21   Da Malik MD  
predniSONE (DELTASONE) 20 mg tablet Take 20 mg by mouth three (3) times daily. 5/3/21   Da Malik MD  
rOPINIRole (Requip) 0.25 mg tablet Take 1 Tab by mouth nightly. 4/28/21   Da Malik MD  
furosemide (Lasix) 40 mg tablet Take 1 Tab by mouth every other day.  
Patient taking differently: Take 40 mg by mouth daily. 2/17/21   Avila Villavicencio MD  
minocycline (DYNACIN) 100 mg tablet Take 1 Tab by mouth daily. 2/17/21   Avila Villavicencio MD  
metFORMIN (GLUCOPHAGE) 500 mg tablet TAKE 1 TABLET BY MOUTH TWICE DAILY WITH MEALS Patient taking differently: Take 500 mg by mouth two (2) times daily (with meals). 1/23/21   Avila Villavicencio MD  
spironolactone (ALDACTONE) 50 mg tablet Take 1 Tab by mouth two (2) times a day. 12/3/20   Avila Villavicencio MD  
ascorbic acid (VITAMIN C PO) Take  by mouth. Provider, Historical  
simvastatin (ZOCOR) 20 mg tablet TAKE 1 TABLET BY MOUTH NIGHTLY 6/28/20   Avila Villavicencio MD  
metFORMIN (GLUCOPHAGE) 500 mg tablet Take 1 Tab by mouth two (2) times daily (with meals). Patient taking differently: Take 500 mg by mouth daily (with breakfast). 10/7/19   Avila Villavicencio MD  
multivit with minerals/lutein (MULTIVITAMIN 50 PLUS PO) Take 1 Tab by mouth daily. Provider, Historical  
cholecalciferol (VITAMIN D3) 2,000 unit cap capsule Take 2,000 Units by mouth daily. Provider, Historical  
magnesium oxide (MAG-OX) 400 mg tablet Take 400 mg by mouth daily. Provider, Historical  
 
 
Hospital Medications: 
Current Facility-Administered Medications Medication Dose Route Frequency  sodium chloride 0.9 % bolus infusion 500 mL  500 mL IntraVENous ONCE  
 dextrose (D50W) injection syrg 25 g  25 g IntraVENous ONCE  
 insulin regular (NOVOLIN R, HUMULIN R) injection 10 Units  10 Units IntraVENous ONCE Current Outpatient Medications Medication Sig  ciprofloxacin HCl (CILOXAN) 0.3 % ophthalmic solution  erythromycin (ILOTYCIN) ophthalmic ointment  blood sugar diagnostic (ONETOUCH ULTRA TEST) by Does Not Apply route.  glucose blood VI test strips (OneTouch Ultra Blue Test Strip) strip Testing daily as directed, M24.0  
 OneTouch Delica Lancets 30 gauge misc Testing daily as directed, E11.9  
 OneTouch Ultra2 Meter misc Testing daily as directed, E11.9  valACYclovir (VALTREX) 1 gram tablet Take 1 Tab by mouth three (3) times daily.  predniSONE (DELTASONE) 20 mg tablet Take 20 mg by mouth three (3) times daily.  rOPINIRole (Requip) 0.25 mg tablet Take 1 Tab by mouth nightly.  furosemide (Lasix) 40 mg tablet Take 1 Tab by mouth every other day. (Patient taking differently: Take 40 mg by mouth daily.)  minocycline (DYNACIN) 100 mg tablet Take 1 Tab by mouth daily.  metFORMIN (GLUCOPHAGE) 500 mg tablet TAKE 1 TABLET BY MOUTH TWICE DAILY WITH MEALS (Patient taking differently: Take 500 mg by mouth two (2) times daily (with meals). )  spironolactone (ALDACTONE) 50 mg tablet Take 1 Tab by mouth two (2) times a day.  ascorbic acid (VITAMIN C PO) Take  by mouth.  simvastatin (ZOCOR) 20 mg tablet TAKE 1 TABLET BY MOUTH NIGHTLY  multivit with minerals/lutein (MULTIVITAMIN 50 PLUS PO) Take 1 Tab by mouth daily.  cholecalciferol (VITAMIN D3) 2,000 unit cap capsule Take 2,000 Units by mouth daily.  magnesium oxide (MAG-OX) 400 mg tablet Take 400 mg by mouth daily. Social History: 
Social History Tobacco Use  Smoking status: Never Smoker  Smokeless tobacco: Never Used Substance Use Topics  Alcohol use: No  
 
 
Pt denies any history of drug use, blood transfusions, or tattoos. Family History: 
Family History Problem Relation Age of Onset  Hypertension Daughter  Heart Disease Mother  Cancer Neg Hx Review of Systems: A detailed 10 system ROS is obtained, with pertinent positives as listed above. All others are negative. Diet:  NPO Objective:  
 
Physical Exam: 
Vitals: 
Visit Vitals /70 Pulse (!) 116 Temp 98.4 °F (36.9 °C) Resp 18 Ht 5' 10\" (1.778 m) Wt 78.9 kg (174 lb) SpO2 99% BMI 24.97 kg/m² Gen:  Pt is alert, cooperative, no acute distress, laying on a stretcher in the ED, muscle wasting. Skin:  Extremities and face reveal no rashes. HEENT: Sclerae Icteric. Extra-occular muscles are intact. No oral ulcers. No abnormal pigmentation of the lips. The neck is supple. + spider angiomas Cardiovascular: Regular rate and rhythm. + Murmur Respiratory:  Comfortable breathing with no accessory muscle use. Clear breath sounds anteriorly with no wheezes, rales, or rhonchi. GI:  Abdomen distended, soft, and nontender. Periumbilical hernia is present and oozing BRB from an open sore. Normal active bowel sounds. No enlargement of the liver or spleen. No masses palpable. Rectal:  Deferred Musculoskeletal:  No pitting edema of the lower legs. Neurological:  Gross memory appears intact. Patient is alert and oriented. NEGATIVE ASTERIXIS. Psychiatric:  Mood appears appropriate with judgement intact. Lymphatic:  No cervical or supraclavicular adenopathy. Laboratory:   
Recent Labs 05/18/21 
1032 WBC 8.1 HGB 11.1*  
HCT 31.1*  
* MCV 95.4 *  
K 5.4* CL 96* CO2 22 BUN 66* CREA 2.03* CA 8.9 MG 2.2 *  AST 40* ALT 36  
TBILI 2.4* ALB 3.0*  
TP 6.2* Interventional Radiology Brief Procedure Note 
  
Patient: Amanda Kent MRN: [id number]  SSN: [id number] YOB: 1945  Age: 70 y.o. Sex: male   
  
Date of Procedure: 5/4/2021 
  
Pre-Procedure Diagnosis: Recurrent ascites.  
  
Post-Procedure Diagnosis: SAME 
  
Procedure(s): Paracentesis 
  
Brief Description of Procedure: as above 
  
Performed By: Alan Thomas MD  
  
Assistants: None 
  
Anesthesia:Lidocaine 
  
Estimated Blood Loss: None 
  
Specimens:  None 
  
Implants:  None 
  
Findings: Large volume ascites.  
  
Complications: None 
  
Recommendations: None  
  
Follow Up: None 
  
Signed By: Alan Thomas MD   
  May 4, 2021   
 
 
  
5/4/2021 
  
Procedure(s): Ultrasound guided Therapeutic Paracentesis Performed with 8 Martiniquais catheter total volume 05455 ml. . 
 
 
 
Assessment:  
 
Active Problems:   * No active hospital problems. * 
 Patient is a 70 y.o. male with PMH including but not limited to DM II, CKD, HTN, HLD, aortic stenosis , who is seen in consultation at the request of Dr. Roselyn Dominguez for heme + stool and thrombocytopenia. Mr. Misa Lockwood follows with Dr. Carolina Ruffin for history of NAFLD cirrhosis and ascites. EGD in 2019 with PHG, no varices. He was recently scheduled for screening EGD but this had to be cancelled due to family situation. He is overdue for screening US for [address] He is having nausea daily. Had some blood in the toilet bowl which the patient reports is from a toe laceration. He is found to have a bleeding periumbilical hernia on exam. DANIELLA without blood but FOBT was positive. Labs are stable. He has had some confusion, lethargy, and has an elevated ammonia level. Plan:  
 
RUQ US to further evaluate the nausea and cover [address] screening Antiemetics PRN Follow labs daily EGD to evaluate for possible PUD, gastritis, Esophagitis, EV. Plan for EGD on 5/19. Continue with PPI IV BID Check INR Start on lactulose for HE 
NPO after midnight Careful titration of diuretics with elevated creatinine level Low Na diet, when not NPO Risk of procedure discussed with patient to include risk of infection, bleeding, perforation, sedation reaction, exacerbation of underlying medical conditions, and death. Patient voiced understanding and wishes to plan for procedure when appropriate. Tash Uribe, JENNIFER Patient is seen and examined in collaboration with Dr. Carlton Sen. Assessment and plan as per Dr. Carlton Sen.

## 2021-05-18 NOTE — ROUTINE PROCESS
Bedside and Verbal shift change report given to [address] (oncoming nurse) by self (offgoing nurse). Report included the following information SBAR, Kardex, ED Summary, Intake/Output, MAR and Recent Results.

## 2021-05-18 NOTE — ED NOTES
Pt moved into private room for a closer look. Found to have an area to unbilical hernia that was bleeding. Superficial. Dr.Horn esteves bonded it. In and out cath performed. Hemoccult done and it was positive. Moved back to lane bed at this time and wife at bedside.

## 2021-05-19 NOTE — WOUND CARE
Pt seen for wound on umbilical hernia documented as present on admission. Noted after chart review area had a lot of bleeding and dermabond was placed over open area on ER at this admission. Noted on patient very protruding large umbilical hernia, pt states that he has had this hernia for over 2 years and it progressively had worsened. Noted on anterior portion of umbilical hernia dermabond and some dried blood. Cleansed with dermal wound cleanser and covered with foam dressing, recommend to keep covered with foam dressing daily and as needed for drainage. Pt states he is not currently a candidate for surgery due to increased risk of bleeding. Noted on chart review consult to palliative for goals of care to be established. Area is stretching the skin taught and only way to prevent cracking or breaking is to surgically remove/fix the hernia. Until goals of care are established will control drainage and provide comfort. Wound team will continue to follow while in acute care setting.

## 2021-05-19 NOTE — PROGRESS NOTES
TRANSFER - IN REPORT: 
 
Verbal report received from [facility] on [place]  being received from [phone] for ordered procedure Report consisted of patients Situation, Background, Assessment and  
Recommendations(SBAR). Information from the following report(s) SBAR, Intake/Output, MAR, Recent Results, Med Rec Status and Pre Procedure Checklist was reviewed with the receiving nurse. Opportunity for questions and clarification was provided.

## 2021-05-19 NOTE — PROCEDURES
PROCEDURE: Esophagogastroduodenoscopy ENDOSCOPIST: Jillian Tillman M.D. PREOPERATIVE DIAGNOSIS: Nausea POSTOPERATIVE DIAGNOSIS: Esophageal varices, portal hypertensive gastropathy INSTRUMENTS: [id number] 
 
SEDATION: MAC DESCRIPTION: After informed consent was obtained, the patient was taken to the endoscopy suite and placed in the left lateral decubitus position. Intravenous sedation was administered by the Anesthesia provider. After adequate sedation had been achieved the endoscope was inserted over the tongue and through the posterior pharynx under direct visualization down the esophagus to the stomach and into the second portion of the duodenum. The endoscopic was withdrawn from that point, performing a careful survey of the mucosa. Retroflexion was performed in the gastric fundus. FINDINGS: 
Esophagus: Grade 2 varices without bleeding stigmata. Stomach: Moderate PHG Duodenum: Normal 
 
Estimated blood loss:  0 cc-minimal 
 
IMPRESSION: No bleeding or source for nausea. He denies nausea today. He has moderate varices and needs primary bleeding prophylaxis. PLAN: Start propranolol, advance diet D. Joselin Medina MD

## 2021-05-19 NOTE — INTERVAL H&P NOTE
Update History & Physical 
 
The Patient's History and Physical of May 18,  
2021 was reviewed with the patient and I examined the patient. There was no change. The surgical site was confirmed by the patient and me. Plan:  The risk, benefits, expected outcome, and alternative to the recommended procedure have been discussed with the patient. Patient understands and wants to proceed with the procedure.  
 
Electronically signed by Amisha Hernandez MD on 5/19/2021 at 10:56 AM

## 2021-05-19 NOTE — PROGRESS NOTES
[facility]ist Progress Note Name:  Abena Bocanegra  Age:75 y.o. Sex:male :  1945 MRN:  [id number] Admit Date:  2021 Reason for Admission: 
GIB (gastrointestinal bleeding) [K92.2] Acute hepatic encephalopathy [K72.00] Hyperkalemia [E87.5] Assessment & Plan GI bleed 
-Patient has a positive fecal occult 
-Stool is not grossly black 
-We will monitor H&H and transfuse as appropriate 
-GI consulted 
-Continue IV Protonix 
-EGD per them 
-N.p.o. for now 
-Serial H&H 
-Transfuse for any hemoglobin less than 7 
21 EGD showing EGD showing esophageal varices G2 without bleeding; Moderate PHG. No source of bleeding found. Hgb holding at 10.1 and will be monitored daily. 2G Na diet/protonix Hepatic encephalopathy 
-We will give lactulose 
-Monitor his ammonia levels  
-He is nonfocal. 
-Low threshold for CT head and further indicated. 21 Resolving; Patient has been declining before hospitalization per family; likely 2/2 elevated ammonia levels. Continue lactulose and monitor Hyperkalemia 
-He got calcium gluconate in the ER we will continue him on remote telemetry at this time  
-We will also give dextrose and insulin 
-Continue dextrose in setting of n.p.o. status secondary to GI bleed with plans for possible EGD in the a.m. 
-Repeat potassium levels today 21 Resolved K+ 5.0 and will be monitored Hyponatremia 21 Na 127; likely 2/2 volume overload with ascites; Fluid restriction; urine Na and urine osmol pending; IR for paracentesis prn 
  
Bleeding from umbilicus 
-This was Dermabond in the ER 
-Likely the source of significant bleeding per them 
-We will monitor 21 Wound care consulted; wound with serosanguinous drainage 
  
Cirrhosis of the liver 
-Continue appropriate medical management 
-GI input appreciated 
-They plan to get the right upper quadrant ultrasound to check the patient's gallbladder that he canceled and refused to have done per family. Will also be appropriate for hepatocellular's screening 5/19/21 Abd US showing large volume ascites, patent portal veins and cholelithiasis. No suspicious hepatic masses. Patient has declined pursuing liver transplant. Continue Lactulose to a goal of 3 BMs per day Palliative consult to establish [address] and possible hospice consult. Patient does have poor prognosis per GI; appreciate assistance 5/19/21 Restart lasix/spironalcatone  in a.m. Diet:  DIET CARDIAC 
DVT PPx: SCDs GI Ppx: Protonix Code: Full Code Dispo/Discharge Planning: Dispo pending Hospital Course/Subjective:  
Patient is a 70 y.o. male with medical h/o significant for cirrhosis and varices with a history of paracentesis. He presented to the ER with a complaint of fatigue and some blood in the toilet with a concern for rectal bleeding. Denied any hematemesis or any other associated symptoms. He was scheduled for a right upper quadrant US and liver biopsy but had to cancel due to his spouse being hospitalized and fear of what would be found in the biopsy. Subjective/24 hr Events (05/19/21): 
5/19/21 Patient denies abdominal pain, N/V. He does have a new complaint of back pain. Reports he was unaware of the severity of his liver disease Review of Systems: 14 point review of systems is otherwise negative with the exception of the elements mentioned above. Objective:  
 
Patient Vitals for the past 24 hrs: 
 Temp Pulse Resp BP SpO2  
05/19/21 1147  (!) 105 16 (!) 108/55 100 % 05/19/21 1137  (!) 106  (!) 122/58 100 % 05/19/21 1127  (!) 105  112/60 100 % 05/19/21 1118 97.4 °F (36.3 °C) 99 15 (!) 123/59 100 % 05/19/21 1103  (!) 108 18 (!) 153/77 100 % 05/19/21 1037 98.8 °F (37.1 °C) (!) 112 20 126/73 100 % 05/19/21 0720 97.9 °F (36.6 °C) (!) 110 19 99/60 99 % 05/19/21 0324 97.5 °F (36.4 °C) (!) 109 19 121/75 99 % 05/19/21 0012  (!) 113     
05/18/21 2352 98.1 °F (36.7 °C) (!) 105 20 123/78 98 %  
05/18/21 1928 97.7 °F (36.5 °C) (!) 111 19 115/71 98 % 05/18/21 1706 97.3 °F (36.3 °C) (!) 115 19 127/71 98 % 05/18/21 1552 97.5 °F (36.4 °C) (!) 113  107/65 100 % 05/18/21 1345  (!) 109  114/65 99 % 05/18/21 1259  (!) 119  119/69 99 % Oxygen Therapy O2 Sat (%): 100 % (05/19/21 1147) Pulse via Oximetry: 106 beats per minute (05/19/21 1147) O2 Device: CO2 nasal cannula (05/19/21 1147) O2 Flow Rate (L/min): 4 l/min (05/19/21 1118) Body mass index is 24.97 kg/m². Physical Exam:  
General: No acute distress, speaking in full sentences, no use of accessory muscles HEENT: Pupils equal; oropharynx is clear Neck: no JVD Lungs: Clear to auscultation bilaterally Cardiovascular: Regular rate and rhythm with normal S1 and S2 Abdomen: Soft, nontender, distended, normoactive bowel sounds; mild serosanguinous drainage from umbilicus Extremities: No cyanosis clubbing or edema Neuro: Nonfocal, A&O x3  
Psych: Normal affect Data Review: 
I have reviewed all labs, meds, and studies from the last 24 hours: 
 
Labs: 
 
Recent Results (from the past 24 hour(s)) HGB & HCT Collection Time: 05/18/21  3:36 PM  
Result Value Ref Range HGB 10.4 (L) 13.6 - 17.2 g/dL HCT 29.6 (L) 41.1 - 50.3 % POTASSIUM Collection Time: 05/18/21  3:36 PM  
Result Value Ref Range Potassium 5.0 3.5 - 5.1 mmol/L  
HEPATIC FUNCTION PANEL Collection Time: 05/18/21  3:36 PM  
Result Value Ref Range Protein, total 6.0 (L) 6.3 - 8.2 g/dL Albumin 2.8 (L) 3.2 - 4.6 g/dL Globulin 3.2 2.3 - 3.5 g/dL A-G Ratio 0.9 (L) 1.2 - 3.5 Bilirubin, total 2.1 (H) 0.2 - 1.1 MG/DL Bilirubin, direct 0.9 (H) <0.4 MG/DL Alk. phosphatase 97 50 - 136 U/L  
 AST (SGOT) 37 15 - 37 U/L  
 ALT (SGPT) 30 12 - 65 U/L  
LACTIC ACID Collection Time: 05/18/21  6:53 PM  
Result Value Ref Range Lactic acid 2.6 (H) 0.4 - 2.0 MMOL/L  
HGB & HCT  Collection Time: 05/18/21  8:51 PM  
Result Value Ref Range HGB 10.3 (L) 13.6 - 17.2 g/dL HCT 28.7 (L) 41.1 - 50.3 % LACTIC ACID Collection Time: 05/18/21 11:22 PM  
Result Value Ref Range Lactic acid 2.5 (H) 0.4 - 2.0 MMOL/L  
HGB & HCT Collection Time: 05/19/21  4:23 AM  
Result Value Ref Range HGB 10.6 (L) 13.6 - 17.2 g/dL HCT 29.8 (L) 41.1 - 50.3 % METABOLIC PANEL, BASIC Collection Time: 05/19/21  4:23 AM  
Result Value Ref Range Sodium 127 (L) 138 - 145 mmol/L Potassium 5.0 3.5 - 5.1 mmol/L Chloride 97 (L) 98 - 107 mmol/L  
 CO2 21 21 - 32 mmol/L Anion gap 9 7 - 16 mmol/L Glucose 119 (H) 65 - 100 mg/dL BUN 63 (H) 8 - 23 MG/DL Creatinine 1.77 (H) 0.8 - 1.5 MG/DL  
 GFR est AA 49 (L) >60 ml/min/1.73m2 GFR est non-AA 40 (L) >60 ml/min/1.73m2 Calcium 9.0 8.3 - 10.4 MG/DL  
LACTIC ACID Collection Time: 05/19/21  8:02 AM  
Result Value Ref Range Lactic acid 1.6 0.4 - 2.0 MMOL/L  
HGB & HCT Collection Time: 05/19/21  8:49 AM  
Result Value Ref Range HGB 10.1 (L) 13.6 - 17.2 g/dL HCT 28.8 (L) 41.1 - 50.3 % All Micro Results None EKG Results Procedure 720 Value Units Date/Time EKG, 12 LEAD, SUBSEQUENT [[id number]] Collected: 05/18/21 1224 Order Status: Completed Updated: 05/18/21 1549 Ventricular Rate 111 BPM   
  Atrial Rate 111 BPM   
  P-R Interval 160 ms QRS Duration 76 ms   
  Q-T Interval 320 ms QTC Calculation (Bezet) 435 ms Calculated P Axis 68 degrees Calculated R Axis 12 degrees Calculated T Axis 68 degrees Diagnosis --  
  !! AGE AND GENDER SPECIFIC ECG ANALYSIS !! Sinus tachycardia Otherwise normal ECG When compared with ECG of 18-MAY-2021 10:40, No significant change was found Confirmed by [facility]  MD (), Malu Solomon ([id number]) on 5/18/2021 3:48:40 PM 
  
 EKG 12 LEAD INITIAL [[id number]] Collected: 05/18/21 1040 Order Status: Completed Updated: 05/18/21 1544   Ventricular Rate 115 BPM   
  Atrial Rate 115 BPM   
  P-R Interval 156 ms QRS Duration 70 ms Q-T Interval 302 ms QTC Calculation (Bezet) 417 ms Calculated P Axis 61 degrees Calculated R Axis 2 degrees Calculated T Axis 79 degrees Diagnosis --  
  !! AGE AND GENDER SPECIFIC ECG ANALYSIS !! Sinus tachycardia 
possible  Inferior infarct , age undetermined 
low voltage Abnormal ECG No previous ECGs available Confirmed by [facility]  MD ()Anthony ([id number]) on 5/18/2021 3:43:43 PM 
  
  
 
 
Other Studies: XR CHEST PA LAT Result Date: 5/18/2021 EXAM: XR CHEST PA LAT INDICATION: weakness COMPARISON: None FINDINGS: The cardiomediastinal silhouette is within normal limits. Atherosclerotic calcifications in the aorta. No pleural effusion or pneumothorax. Pulmonary venous congestion with suggestion of increased interstitial opacities with bibasilar predominance. No focal consolidation. Osseous structures are grossly intact with diffuse osteopenia. 1. Pulmonary venous congestion with probable mild interstitial pulmonary edema. 2. Atherosclerosis. US ABD LTD Result Date: 5/18/2021 Exam: Right upper quadrant ultrasound. Indication: Nausea, elevated LFTs, cirrhosis Comparison: Abdominal ultrasound, 8/2/2018 Findings: Pancreas: Not well visualized due to shadowing bowel gas. Liver: The liver demonstrates nodular contour and heterogeneous echotexture throughout. No suspicious hepatic masses. The main portal vein is patent with an appropriate direction of flow and velocity. Gallbladder: Gallbladder wall is normal in thickness. The gallbladder is not distended. There is cholelithiasis. No pericholecystic fluid. Absent sonographic Jordan sign per the sonographer. Biliary: No intra or extrahepatic biliary duct dilatation. Right kidney: The right kidney is normal in echogenicity and normal in size measuring 9.9 cm. No contour deforming mass. No hydronephrosis or perinephric fluid. Abdominal Aorta: Nonaneurysmal in the visualized portion. Inferior Vena Cava: Patent. Ascites: Large volume ascites. 1. Morphologic changes of hepatic cirrhosis with large volume of abdominopelvic ascites. 2. The portal veins remain patent with appropriate direction of flow. 3. Cholelithiasis without acute cholecystitis. Current Meds:  
Current Facility-Administered Medications Medication Dose Route Frequency  [START ON 5/20/2021] pantoprazole (PROTONIX) tablet 40 mg  40 mg Oral ACB  propranoloL (INDERAL) tablet 20 mg  20 mg Oral TID  sodium chloride (NS) flush 5-40 mL  5-40 mL IntraVENous Q8H  
 sodium chloride (NS) flush 5-40 mL  5-40 mL IntraVENous PRN  
 ondansetron (ZOFRAN) injection 4 mg  4 mg IntraVENous Q4H PRN  
 lactulose (CHRONULAC) 10 gram/15 mL solution 30 mL  20 g Oral TID Problem List: 
Hospital Problems as of 5/19/2021 Date Reviewed: 4/22/2021 Codes Class Noted - Resolved POA Hyperkalemia ICD-10-CM: E87.5 ICD-9-CM: 276.7  5/18/2021 - Present Unknown GIB (gastrointestinal bleeding) ICD-10-CM: K92.2 ICD-9-CM: 578.9  5/18/2021 - Present Unknown Acute hepatic encephalopathy ICD-10-CM: K72.00 ICD-9-CM: 572.2  5/18/2021 - Present Unknown Part of this note was written by using a voice dictation software and the note has been proof read but may still contain some grammatical/other typographical errors. Signed By: Anjana Cameron  [facility] Service  May 19, 2021  12:33 PM

## 2021-05-19 NOTE — PROGRESS NOTES
Care Management Interventions PCP Verified by CM: Yes (Dr. Gen Wu) Mode of Transport at Discharge: Other (see comment) (To be determined based on needs) Transition of Care Consult (CM Consult): Discharge Planning Discharge Durable Medical Equipment: No 
Physical Therapy Consult: No 
Occupational Therapy Consult: No 
Speech Therapy Consult: No 
Current Support Network: Own Home, Lives with Spouse, Family Lives [place] Confirm Follow Up Transport: Self The Patient and/or Patient Representative was Provided with a Choice of Provider and Agrees with the Discharge Plan?: Yes Name of the Patient Representative Who was Provided with a Choice of Provider and Agrees with the Discharge Plan: Patient and Linda Ramirez (wife) Freedom of Choice List was Provided with Basic Dialogue that Supports the Patient's Individualized Plan of Care/Goals, Treatment Preferences and Shares the Quality Data Associated with the Providers?: Yes [employer] Information Provided?: No 
Discharge Location Discharge Placement: Unable to determine at this time CM met with patient in room, wife at bedside. Patient alert and orient but appears sleepy and weak. Patient and wife verified all demographic information to be correct. Patient stated he and his wife live in a one story home that has one step to enter the residence. Patient stated he does not use any DME at this time. Patient wife stated patient was able to perform all ADL's independently up until 1 week ago and now requires her assistance. Patient is able to afford his needed medications and obtains them from [address] [place] [address]. Patient has never used [facility] or been to [facility] and would like to return home at discharge. Palliative care has been consulted. CM will continue to follow patient during hospitalization for discharge planning and needs. Please consult or notify CM of new needs.

## 2021-05-19 NOTE — PROGRESS NOTES
EGD complete; see note for details. GI bleeding has not been witnessed. I have stopped serial hgb and IV Protonix. There is no further need for hospitalization from GI perspective. He needs wound care for the umbilical hernia wound. He is not a surgical candidate. He needs a 2g Na diet. We started lactulose during this hospitalization due to apparent mild hepatic encephalopathy. I discussed his terminal diagnosis with him. He says he was not aware that his liver was so bad. I had a long conversation with his daughter after the procedure about his condition and prognosis. I estimate life expectancy at less than 6 months. She showed me a video of him at [place] laughing, appearing well. He is clearly declining rapidly. There is no treatment that will reverse or slow this process. Treatment should be directed toward his comfort and keeping him out of the hospital. 
 
He is in no condition to go home. I suggest Palliative Care consult to discuss end-of-life. In my opinion, hospice would be appropriate. I have no further advice at this time. He should remain on lactulose indefinitely, titrated to 2-3 BM/day. I will start propranolol for primary bleeding prophylaxis. I will arrange outpatient follow up. GI signing off.

## 2021-05-19 NOTE — ANESTHESIA PREPROCEDURE EVALUATION
Relevant Problems No relevant active problems Anesthetic History Review of Systems / Medical History Pulmonary Neuro/Psych Cardiovascular Hypertension: well controlled Valvular problems/murmurs: aortic stenosis Exercise tolerance: >4 METS Comments: Severe aortic stenosis, denies angina GI/Hepatic/Renal 
  
 
 
Renal disease: CRI Liver disease Endo/Other Diabetes: type 2 Other Findings Physical Exam 
 
Airway Mallampati: II 
TM Distance: > 6 cm Neck ROM: normal range of motion Mouth opening: Normal 
 
 Cardiovascular Rhythm: regular Rate: abnormal 
 
Murmur: Grade 4, Aortic area Comments: tachycardia Dental 
No notable dental hx Pulmonary Breath sounds clear to auscultation Abdominal 
 
 
 
 Other Findings Anesthetic Plan ASA: 4, emergent Anesthesia type: total IV anesthesia Induction: Intravenous Anesthetic plan and risks discussed with: Patient

## 2021-05-19 NOTE — PROGRESS NOTES
[employer] [employer] TRANSFER - IN REPORT: 
 
Verbal report received from ANTHONY Martin on Erasmo Gordon  being received from GI Lab for ordered procedure Report consisted of patients Situation, Background, Assessment and  
Recommendations(SBAR). Information from the following report(s) SBAR, Kardex, Procedure Summary, Recent Results and Cardiac Rhythm  was reviewed with the receiving nurse. Opportunity for questions and clarification was provided. Assessment completed upon patients arrival to unit and care assumed.

## 2021-05-19 NOTE — ANESTHESIA POSTPROCEDURE EVALUATION
Procedure(s): ESOPHAGOGASTRODUODENOSCOPY (EGD)/ BMI 24 ROOM 206. total IV anesthesia Anesthesia Post Evaluation Patient location during evaluation: PACU Patient participation: complete - patient participated Level of consciousness: awake Pain management: adequate Airway patency: patent Anesthetic complications: no 
Cardiovascular status: acceptable Respiratory status: acceptable Hydration status: acceptable Post anesthesia nausea and vomiting:  none

## 2021-05-20 NOTE — PROGRESS NOTES
[facility] Hospitalist Progress Note Name:  Nanda Davies  Age:75 y.o. Sex:male :  1945 MRN:  [id number] Admit Date:  2021 Reason for Admission: 
GIB (gastrointestinal bleeding) [K92.2] Acute hepatic encephalopathy [K72.00] Hyperkalemia [E87.5] Assessment & Plan GI bleed 
-Patient has a positive fecal occult 
-Stool is not grossly black 
-We will monitor H&H and transfuse as appropriate 
-GI consulted 
-Continue IV Protonix 
-EGD per them 
-N.p.o. for now 
-Serial H&H 
-Transfuse for any hemoglobin less than 7 
21 EGD showing EGD showing esophageal varices G2 without bleeding; Moderate PHG. No source of bleeding found. Hgb holding at 10.1 and will be monitored daily. 2G Na diet/protonix 21  Palliative has seen patient and he is now considering hospice care. Will discuss with family. Hgb 9.8 and will be monitored 
  
Hepatic encephalopathy 
-We will give lactulose 
-Monitor his ammonia levels  
-He is nonfocal. 
-Low threshold for CT head and further indicated. 21 Resolving; Patient has been declining before hospitalization per family; likely 2/2 elevated ammonia levels. Continue lactulose and monitor 21 Resolved; Continue Lactulose. He will need long term 
  
Hyperkalemia 
-He got calcium gluconate in the ER we will continue him on remote telemetry at this time  
-We will also give dextrose and insulin 
-Continue dextrose in setting of n.p.o. status secondary to GI bleed with plans for possible EGD in the a.m. 
-Repeat potassium levels today 21 Resolved K+ 5.0 and will be monitored 
  
Hyponatremia 21 Na 127; likely 2/2 volume overload with ascites; Fluid restriction; urine Na and urine osmol pending; IR for paracentesis prn 
21 Continuing slow correction; now 128; urine Na/Osmol pending Bleeding from umbilicus 
-This was Dermabond in the ER 
-Likely the source of significant bleeding per them 
-We will monitor 21 Wound care consulted; wound with serosanguinous drainage 5/20/21 Wound team did see patient  with wound care as directed  
 
Cirrhosis of the liver 
-Continue appropriate medical management 
-GI input appreciated 
-They plan to get the right upper quadrant ultrasound to check the patient's gallbladder that he canceled and refused to have done per family.  Will also be appropriate for hepatocellular's screening 5/19/21 Abd US showing large volume ascites, patent portal veins and cholelithiasis. No suspicious hepatic masses. Patient has declined pursuing liver transplant. Continue Lactulose to a goal of 3 BMs per day Palliative consult to establish [address] and possible hospice consult. Patient does have poor prognosis per GI; appreciate assistance 5/19/21 Restart lasix/spironalcatone  in a.m. 
5/20/21 Patient is considering hospice. Will continue to treat as above 
  
 
 
 
 
Diet:  DIET CARDIAC 
DVT PPx: SCDs GI Ppx: Protonix Code: Full Code Dispo/Discharge Planning: Dispo pending Hospital Course/Subjective:  
Patient is a 70 y. o. male with medical h/o significant for cirrhosis and varices with a history of paracentesis. He presented to the ER with a complaint of fatigue and some blood in the toilet with a concern for rectal bleeding. Denied any hematemesis or any other associated symptoms. He was scheduled for a right upper quadrant US and liver biopsy but had to cancel due to his spouse being hospitalized and fear of what would be found in the biopsy.   
  
 
 
Subjective/24 hr Events (05/20/21): No significant events overnight Review of Systems: 14 point review of systems is otherwise negative with the exception of the elements mentioned above. Objective:  
 
Patient Vitals for the past 24 hrs: 
 Temp Pulse Resp BP SpO2  
05/20/21 1130 97.7 °F (36.5 °C) 80 18 115/72 96 % 05/20/21 1016  76  113/70   
05/20/21 0720 98.1 °F (36.7 °C) 79 18 111/71 97 % 05/20/21 0301 97.5 °F (36.4 °C) 79 16 118/80 98 % 05/19/21 2251 97.8 °F (36.6 °C) 80 18 135/71 98 % 05/19/21 2022 97.4 °F (36.3 °C) (!) 115 18 (!) 148/81 98 % 05/19/21 1600  (!) 116    Oxygen Therapy O2 Sat (%): 96 % (05/20/21 1130) Pulse via Oximetry: 106 beats per minute (05/19/21 1147) O2 Device: CO2 nasal cannula (05/19/21 1147) O2 Flow Rate (L/min): 4 l/min (05/20/21 0032) Body mass index is 24.97 kg/m². Physical Exam:  
General: No acute distress, speaking in full sentences, no use of accessory muscles HEENT: Pupils equal; oropharynx is clear Neck: no JVD Lungs: Clear to auscultation bilaterally Cardiovascular: Regular rate and rhythm; chronic murmur Abdomen: Soft, nontender, distended, normoactive bowel sounds; mild serosanguinous drainage from umbilicus Extremities: No cyanosis clubbing or edema Neuro: Nonfocal, A&O x3  
Psych: Normal affect  
  
Data Review: 
I have reviewed all labs, meds, and studies from the last 24 hours: 
 
Labs: 
 
Recent Results (from the past 24 hour(s)) METABOLIC PANEL, BASIC Collection Time: 05/20/21  4:23 AM  
Result Value Ref Range Sodium 128 (L) 136 - 145 mmol/L Potassium 5.1 3.5 - 5.1 mmol/L Chloride 98 98 - 107 mmol/L  
 CO2 22 21 - 32 mmol/L Anion gap 8 7 - 16 mmol/L Glucose 109 (H) 65 - 100 mg/dL BUN 55 (H) 8 - 23 MG/DL Creatinine 1.56 (H) 0.8 - 1.5 MG/DL  
 GFR est AA 56 (L) >60 ml/min/1.73m2 GFR est non-AA 47 (L) >60 ml/min/1.73m2 Calcium 8.3 8.3 - 10.4 MG/DL  
HGB & HCT Collection Time: 05/20/21  4:23 AM  
Result Value Ref Range HGB 9.8 (L) 13.6 - 17.2 g/dL HCT 27.9 (L) 41.1 - 50.3 % All Micro Results None EKG Results Procedure 720 Value Units Date/Time EKG, 12 LEAD, SUBSEQUENT [[id number]] Collected: 05/18/21 1224 Order Status: Completed Updated: 05/18/21 1549 Ventricular Rate 111 BPM   
  Atrial Rate 111 BPM   
  P-R Interval 160 ms QRS Duration 76 ms   
  Q-T Interval 320 ms   QTC Calculation (Bezet) 435 ms Calculated P Axis 68 degrees Calculated R Axis 12 degrees Calculated T Axis 68 degrees Diagnosis --  
  !! AGE AND GENDER SPECIFIC ECG ANALYSIS !! Sinus tachycardia Otherwise normal ECG When compared with ECG of 18-MAY-2021 10:40, No significant change was found Confirmed by Vinnie Dowd MD (), Kaveh Eastman ([id number]) on 5/18/2021 3:48:40 PM 
  
 EKG 12 LEAD INITIAL [[id number]] Collected: 05/18/21 1040 Order Status: Completed Updated: 05/18/21 1544 Ventricular Rate 115 BPM   
  Atrial Rate 115 BPM   
  P-R Interval 156 ms QRS Duration 70 ms Q-T Interval 302 ms QTC Calculation (Bezet) 417 ms Calculated P Axis 61 degrees Calculated R Axis 2 degrees Calculated T Axis 79 degrees Diagnosis --  
  !! AGE AND GENDER SPECIFIC ECG ANALYSIS !! Sinus tachycardia 
possible  Inferior infarct , age undetermined 
low voltage Abnormal ECG No previous ECGs available Confirmed by Vinnie Dowd MD (), Kaveh Eastman ([id number]) on 5/18/2021 3:43:43 PM 
  
  
 
 
Other Studies: XR CHEST PA LAT Result Date: 5/18/2021 EXAM: XR CHEST PA LAT INDICATION: weakness COMPARISON: None FINDINGS: The cardiomediastinal silhouette is within normal limits. Atherosclerotic calcifications in the aorta. No pleural effusion or pneumothorax. Pulmonary venous congestion with suggestion of increased interstitial opacities with bibasilar predominance. No focal consolidation. Osseous structures are grossly intact with diffuse osteopenia. 1. Pulmonary venous congestion with probable mild interstitial pulmonary edema. 2. Atherosclerosis. US ABD LTD Result Date: 5/18/2021 Exam: Right upper quadrant ultrasound. Indication: Nausea, elevated LFTs, cirrhosis Comparison: Abdominal ultrasound, 8/2/2018 Findings: Pancreas: Not well visualized due to shadowing bowel gas. Liver: The liver demonstrates nodular contour and heterogeneous echotexture throughout. No suspicious hepatic masses.  The main portal vein is patent with an appropriate direction of flow and velocity. Gallbladder: Gallbladder wall is normal in thickness. The gallbladder is not distended. There is cholelithiasis. No pericholecystic fluid. Absent sonographic Jordan sign per the sonographer. Biliary: No intra or extrahepatic biliary duct dilatation. Right kidney: The right kidney is normal in echogenicity and normal in size measuring 9.9 cm. No contour deforming mass. No hydronephrosis or perinephric fluid. Abdominal Aorta: Nonaneurysmal in the visualized portion. Inferior Vena Cava: Patent. Ascites: Large volume ascites. 1. Morphologic changes of hepatic cirrhosis with large volume of abdominopelvic ascites. 2. The portal veins remain patent with appropriate direction of flow. 3. Cholelithiasis without acute cholecystitis. Current Meds:  
Current Facility-Administered Medications Medication Dose Route Frequency  pantoprazole (PROTONIX) tablet 40 mg  40 mg Oral ACB  propranoloL (INDERAL) tablet 20 mg  20 mg Oral TID  traMADoL (ULTRAM) tablet 50 mg  50 mg Oral Q6H PRN  
 furosemide (LASIX) tablet 40 mg  40 mg Oral DAILY  spironolactone (ALDACTONE) tablet 25 mg  25 mg Oral DAILY  sodium chloride (NS) flush 5-40 mL  5-40 mL IntraVENous Q8H  
 sodium chloride (NS) flush 5-40 mL  5-40 mL IntraVENous PRN  
 ondansetron (ZOFRAN) injection 4 mg  4 mg IntraVENous Q4H PRN  
 lactulose (CHRONULAC) 10 gram/15 mL solution 30 mL  20 g Oral TID Problem List: 
Hospital Problems as of 5/20/2021 Date Reviewed: 4/22/2021 Codes Class Noted - Resolved POA Hyperkalemia ICD-10-CM: E87.5 ICD-9-CM: 276.7  5/18/2021 - Present Unknown GIB (gastrointestinal bleeding) ICD-10-CM: K92.2 ICD-9-CM: 578.9  5/18/2021 - Present Unknown Acute hepatic encephalopathy ICD-10-CM: K72.00 ICD-9-CM: 572.2  5/18/2021 - Present Unknown Part of this note was written by using a voice dictation software and the note has been proof read but may still contain some grammatical/other typographical errors. Signed By: Ephraim Cosby, JENNIFER [address] [facility] Service  May 20, 2021  3:27 PM

## 2021-05-20 NOTE — PROGRESS NOTES
END OF SHIFT NOTE: 
 
INTAKE/OUTPUT 
05/19 0701 - 05/20 0700 In: 500 [P.O.:250; I.V.:250] Out: 800 [Urine:800] Voiding: YES Catheter: NO 
Drain:   
 
 
 
 
 
Flatus: Patient does have flatus present. Stool:  0 occurrences. Characteristics: 
  
 
Emesis: 0 occurrences. Characteristics: VITAL SIGNS Patient Vitals for the past 12 hrs: 
 Temp Pulse Resp BP SpO2  
05/20/21 1912 98 °F (36.7 °C) 78 18 91/60 97 % 05/20/21 1525 97.4 °F (36.3 °C) 81 18 116/73 99 % 05/20/21 1130 97.7 °F (36.5 °C) 80 18 115/72 96 % 05/20/21 1016  76  113/70  Pain Assessment Pain Intensity 1: 0 (05/20/21 0032) Pain Location 1: Back Pain Intervention(s) 1: Position, Rest 
Patient Stated Pain Goal: 0 Ambulating Yes Shift report given to oncoming nurse at the bedside.  
 
Amelia Pandey RN

## 2021-05-20 NOTE — PROGRESS NOTES
END OF SHIFT NOTE: 
 
INTAKE/OUTPUT 
05/19 0701 - 05/20 0700 In: 500 [P.O.:250; I.V.:250] Out: 800 [Urine:800] Voiding: YES Catheter: NO 
Drain:   
 
 
Flatus: Patient does not have flatus present. Stool:  0 occurrences. Characteristics: 
  
 
Emesis: 0 occurrences. Characteristics: VITAL SIGNS Patient Vitals for the past 12 hrs: 
 Temp Pulse Resp BP SpO2  
05/20/21 0301 97.5 °F (36.4 °C) 79 16 118/80 98 % 05/19/21 2251 97.8 °F (36.6 °C) 80 18 135/71 98 % 05/19/21 2022 97.4 °F (36.3 °C) (!) 115 18 (!) 148/81 98 % Pain Assessment Pain Intensity 1: 0 (05/20/21 0032) Pain Location 1: Back Pain Intervention(s) 1: Position, Rest 
Patient Stated Pain Goal: 0 Ambulating No 
 
Shift report given to oncoming nurse at the bedside.  
 
Bettie Haney RN

## 2021-05-20 NOTE — CONSULTS
Palliative Care Patient: Mariel Kay MRN: [id number]  SSN: [id number] YOB: 1945  Age: 70 y.o. Sex: male Date of Request: 5/20/2021 Date of Consult:  5/20/2021 Reason for Consult:  goals of care and medical decision making Requesting Physician: Dr. Adrien Dodd Assessment/Plan:  
 
Principal Diagnosis:   
Debility, Unspecified  R53.81 Additional Diagnoses: · Debility, Unspecified  R53.81 
· Frailty  R54 · Counseling, Encounter for Medical Advice  Z71.9 
· Encounter for Palliative Care  Z51.5 Palliative Performance Scale (PPS): 
  
 
Medical Decision Making:  
Reviewed and summarized labs and imaging. Met with pt at bedside. We discussed current condition and severity of liver disease. Pt states he intends to pursue liver transplant. I acknowledged his wishes and discussed options should pt not qualify for a liver transplant. We discussed hospice philosophy and levels of care. I reviewed code status with pt. He wishes to speak more with his spouse and daughter before making any decisions. Will follow up with pt and family to answer any questions assist with goals discussions. Will discuss findings with members of the interdisciplinary team.   
 
Thank you for this referral.    
 
 
Subjective:  
 
History obtained from:  Patient and Chart Chief Complaint: debility History of Present Illness:  70 y. o. male with medical h/o significant for cirrhosis and varices with a history of paracentesis. He presented to the ER with a complaint of fatigue and some blood in the toilet with a concern for rectal bleeding. Denied any hematemesis or any other associated symptoms. He was scheduled for a right upper quadrant US and liver biopsy but had to cancel due to his spouse being hospitalized and fear of what would be found in the biopsy. Advance Directive: No      
Code Status:  Full Code Health Care Power of : pt spouse would be decision maker. Past Medical History:  
Diagnosis Date  Anemia 9/19/2012  Carotid body tumor ([facility] [address]  Chronic kidney disease   
 patient denies any kidney disease  Chronic kidney disease, stage III (moderate) ([facility] [address] 9/19/2012  DM2 (diabetes mellitus, type 2) ([facility] 9/19/2012 A1C 6.4 1/10/2019- avg 120- denies hypo- takes oral meds only  HLD (hyperlipidemia) 9/19/2012  
 HTN (hypertension) 9/19/2012  
 medication controlled Past Surgical History:  
Procedure Laterality Date  HX COLONOSCOPY    
 IR PARACENTESIS ABD W IMAGE  3/31/2021  IR PARACENTESIS ABD W IMAGE  5/4/2021 Family History Problem Relation Age of Onset  Hypertension Daughter  Heart Disease Mother  Cancer Neg Hx Social History Tobacco Use  Smoking status: Never Smoker  Smokeless tobacco: Never Used Substance Use Topics  Alcohol use: No  
 
Prior to Admission medications Medication Sig Start Date End Date Taking? Authorizing Provider  
ciprofloxacin HCl (CILOXAN) 0.3 % ophthalmic solution  5/5/21   Provider, Historical  
erythromycin (ILOTYCIN) ophthalmic ointment  5/8/21   Provider, Historical  
blood sugar diagnostic (ONETOUCH ULTRA TEST) by Does Not Apply route. Provider, Historical  
glucose blood VI test strips (OneTouch Ultra Blue Test Strip) strip Testing daily as directed, E11.9 5/4/21   Deacon Elliott Mt, MD  
OneTouch Delica Lancets 30 gauge misc Testing daily as directed, E11.9 5/4/21   Deacon Elliott Mt, MD  
OneTouch Ultra2 Meter misc Testing daily as directed, E11.9 5/4/21   Lamount Fix Deacon Howell MD  
valACYclovir (VALTREX) 1 gram tablet Take 1 Tab by mouth three (3) times daily. 5/3/21   Carli Beard MD  
predniSONE (DELTASONE) 20 mg tablet Take 20 mg by mouth three (3) times daily. 5/3/21   Carli Beard MD  
rOPINIRole (Requip) 0.25 mg tablet Take 1 Tab by mouth nightly. 4/28/21   Carli Beard MD  
furosemide (Lasix) 40 mg tablet Take 1 Tab by mouth every other day.  
Patient taking differently: Take 40 mg by mouth daily. 2/17/21   Jaclyn García MD  
minocycline (DYNACIN) 100 mg tablet Take 1 Tab by mouth daily. 2/17/21   Jaclyn García MD  
metFORMIN (GLUCOPHAGE) 500 mg tablet TAKE 1 TABLET BY MOUTH TWICE DAILY WITH MEALS Patient taking differently: Take 500 mg by mouth two (2) times daily (with meals). 1/23/21   Jaclyn García MD  
spironolactone (ALDACTONE) 50 mg tablet Take 1 Tab by mouth two (2) times a day. 12/3/20   Jaclyn García MD  
ascorbic acid (VITAMIN C PO) Take  by mouth. Provider, Historical  
simvastatin (ZOCOR) 20 mg tablet TAKE 1 TABLET BY MOUTH NIGHTLY 6/28/20   Jaclyn García MD  
metFORMIN (GLUCOPHAGE) 500 mg tablet Take 1 Tab by mouth two (2) times daily (with meals). Patient taking differently: Take 500 mg by mouth daily (with breakfast). 10/7/19   Jaclyn García MD  
multivit with minerals/lutein (MULTIVITAMIN 50 PLUS PO) Take 1 Tab by mouth daily. Provider, Historical  
cholecalciferol (VITAMIN D3) 2,000 unit cap capsule Take 2,000 Units by mouth daily. Provider, Historical  
magnesium oxide (MAG-OX) 400 mg tablet Take 400 mg by mouth daily. Provider, Historical  
 
 
Allergies Allergen Reactions  Actos [Pioglitazone] Other (comments) Stops urinating Review of systems negative with exception of noted above Objective:  
 
Visit Vitals /71 Pulse 79 Temp 98.1 °F (36.7 °C) Resp 18 Ht 5' 10\" (1.778 m) Wt 174 lb (78.9 kg) SpO2 97% BMI 24.97 kg/m² Physical Exam: 
 
General:  Cooperative. No acute distress. Eyes:  Conjunctivae/corneas clear Nose: Nares normal. Septum midline. Neck: Supple, symmetrical, trachea midline, no JVD Lungs:   Clear to auscultation bilaterally, unlabored Heart:  Regular rate and rhythm, no murmur Abdomen:   Soft, non-tender, non-distended. Positive bowel sounds Extremities: Normal, atraumatic, no cyanosis or edema Skin: Skin color, texture, turgor normal. No rash or lesions. Neurologic: Nonfocal  
Psych: Alert and oriented Assessment:  
 
Hospital Problems  Date Reviewed: 4/22/2021 Codes Class Noted POA Hyperkalemia ICD-10-CM: E87.5 ICD-9-CM: 276.7  5/18/2021 Unknown GIB (gastrointestinal bleeding) ICD-10-CM: K92.2 ICD-9-CM: 578.9  5/18/2021 Unknown Acute hepatic encephalopathy ICD-10-CM: K72.00 ICD-9-CM: 572.2  5/18/2021 Unknown Signed By: Jenkins Lesches, MD   
 May 20, 2021

## 2021-05-21 NOTE — PROGRESS NOTES
Dr. Valdez Blanco informed CM patient would like more information about Hospice as patient is not sure if that is the route he wants to go. CM provided patient with multiple brochures for different hospice agencies for he and his wife to look over. Patient wife was not here at this time. CM will follow up with patient and his wife regarding hospice choice.

## 2021-05-21 NOTE — PROGRESS NOTES
Palliative Care Progress Note Patient: Esteban Buerger MRN: [id number]  SSN: [id number] YOB: 1945  Age: 70 y.o. Sex: male Assessment/Plan: Chief Complaint/Interval History: denies any current pain, nausea. States he wants to go home Principal Diagnosis: · Fatigue, Lethargy  R53.83 Additional Diagnoses: · Debility, Unspecified  R53.81 
· Frailty  R54 · Counseling, Encounter for Medical Advice  Z71.9 
· Encounter for Palliative Care  Z51.5 Palliative Performance Scale (PPS) Medical Decision Making:  
Reviewed and summarized labs and imaging. Met with pt at bedside. Pt states he spoke with his wife yesterday but wasn't sure whether he wanted to pursue a liver transplant. I discussed hospice again as pt states he really wants to be at home with his family for as long as possible. Pt asked for more information regarding hospice options. I have placed hospice consult and relayed pt wishes to CM. I revisited code status as pt is currently FULL code. He states he is still undecided at this time regarding status. Will follow loosely. Will discuss findings with members of the interdisciplinary team.   
 
More than 50% of this 25 minute visit was spent counseling and coordination of care as outlined above. Subjective:  
 
Review of Systems negative with the exception of as noted above Objective:  
 
Visit Vitals BP (!) 105/59 Pulse 79 Temp 97.9 °F (36.6 °C) Resp 17 Ht 5' 10\" (1.778 m) Wt 174 lb (78.9 kg) SpO2 99% BMI 24.97 kg/m² Physical Exam: 
 
General:  Cooperative. No acute distress. Eyes:  Conjunctivae/corneas clear Nose: Nares normal. Septum midline. Neck: Supple, symmetrical, trachea midline, no JVD Lungs:   Clear to auscultation bilaterally, unlabored Heart:  Regular rate and rhythm, no murmur Abdomen:   Soft, non-tender, mild distention. Extremities: Normal, atraumatic, no cyanosis or edema Skin: Skin color, texture, turgor normal. No rash or lesions. Neurologic: Nonfocal  
Psych: Alert and oriented Signed By: Toni Sheth MD   
 May 21, 2021

## 2021-05-21 NOTE — PROGRESS NOTES
Problem: Pressure Injury - Risk of 
Goal: *Prevention of pressure injury Description: Document Rio Scale and appropriate interventions in the flowsheet. Outcome: Progressing Towards Goal 
Note: Pressure Injury Interventions: 
  
 
Moisture Interventions: Absorbent underpads Activity Interventions: Increase time out of bed, Pressure redistribution bed/mattress(bed type), PT/OT evaluation Mobility Interventions: Pressure redistribution bed/mattress (bed type), PT/OT evaluation Nutrition Interventions: Document food/fluid/supplement intake, Offer support with meals,snacks and hydration Problem: Patient Education: Go to Patient Education Activity Goal: Patient/Family Education Outcome: Progressing Towards Goal 
  
Problem: Falls - Risk of 
Goal: *Absence of Falls Description: Document Burrell Canavan Fall Risk and appropriate interventions in the flowsheet. Outcome: Progressing Towards Goal 
Note: Fall Risk Interventions: 
Mobility Interventions: Communicate number of staff needed for ambulation/transfer, Patient to call before getting OOB Medication Interventions: Patient to call before getting OOB, Teach patient to arise slowly Elimination Interventions: Call light in reach, Patient to call for help with toileting needs, Urinal in reach Problem: Patient Education: Go to Patient Education Activity Goal: Patient/Family Education Outcome: Progressing Towards Goal 
  
Problem: Patient Education: Go to Patient Education Activity Goal: Patient/Family Education Outcome: Progressing Towards Goal

## 2021-05-21 NOTE — PROGRESS NOTES
END OF SHIFT NOTE: 
 
INTAKE/OUTPUT 
05/20 0701 - 05/21 0700 In: 840 [P.O.:840] Out: 1100 [Urine:1100] Voiding: YES Catheter: NO 
Drain:   
 
 
 
 
 
Flatus: Patient does have flatus present. Stool:  0 occurrences. Characteristics: 
  
 
Emesis: 0 occurrences. Characteristics: VITAL SIGNS Patient Vitals for the past 12 hrs: 
 Temp Pulse Resp BP SpO2  
05/21/21 0613  77     
05/21/21 0244 98.3 °F (36.8 °C) 83 18 108/61 97 % 05/21/21 0150  79     
05/20/21 2350 98.3 °F (36.8 °C) 78 18 97/69 95 % 05/20/21 1912 98 °F (36.7 °C) 78 18 91/60 97 % Pain Assessment Pain Intensity 1: 0 (05/20/21 1930) Pain Location 1: Back Pain Intervention(s) 1: Position, Rest 
Patient Stated Pain Goal: 0 Ambulating Yes Shift report given to oncoming nurse at the bedside.  
 
Isidro Jansen RN

## 2021-05-21 NOTE — PROGRESS NOTES
Problem: Pressure Injury - Risk of 
Goal: *Prevention of pressure injury Description: Document Rio Scale and appropriate interventions in the flowsheet. Outcome: Progressing Towards Goal 
Note: Pressure Injury Interventions: 
  
 
Moisture Interventions: Absorbent underpads, Apply protective barrier, creams and emollients, Check for incontinence Q2 hours and as needed, Internal/External urinary devices Activity Interventions: Increase time out of bed, PT/OT evaluation Mobility Interventions: Assess need for specialty bed, HOB 30 degrees or less, PT/OT evaluation Nutrition Interventions: Document food/fluid/supplement intake, Offer support with meals,snacks and hydration Problem: Patient Education: Go to Patient Education Activity Goal: Patient/Family Education Outcome: Progressing Towards Goal 
  
Problem: Falls - Risk of 
Goal: *Absence of Falls Description: Document Burrell Canavan Fall Risk and appropriate interventions in the flowsheet. Outcome: Progressing Towards Goal 
Note: Fall Risk Interventions: 
Mobility Interventions: Communicate number of staff needed for ambulation/transfer, Bed/chair exit alarm, Patient to call before getting OOB, PT Consult for mobility concerns, PT Consult for assist device competence, OT consult for ADLs Medication Interventions: Bed/chair exit alarm, Evaluate medications/consider consulting pharmacy, Patient to call before getting OOB, Teach patient to arise slowly Elimination Interventions: Bed/chair exit alarm, Call light in reach, Patient to call for help with toileting needs, Toileting schedule/hourly rounds, Urinal in reach Problem: Patient Education: Go to Patient Education Activity Goal: Patient/Family Education Outcome: Progressing Towards Goal

## 2021-05-21 NOTE — PROGRESS NOTES
ACUTE PHYSICAL THERAPY GOALS: 
(Developed with and agreed upon by patient and/or caregiver. ) LT. Pt will be able to perform bed mobility and transfers with independence in order to be able to safely function within their home within 7 treatment days. 2. Pt will be able to ambulate a minimum of 500 ft with modified independence and use of least restrictive device in order to return to home and community ambulation within 7 treatment days. 3. Pt will be able to ambulate up/down 2 stairs with modified independence in order to access his/her home safely within 7 treatment days. 4. Pt will demonstrate good standing and normal sitting balance with independence in order to safely perform functional mobility and ADLS within 7 treatment days. PHYSICAL THERAPY ASSESSMENT: Initial Assessment PT Treatment Day # 1 Yan Slaughter is a 70 y.o. male PRIMARY DIAGNOSIS: <principal problem not specified> 
GIB (gastrointestinal bleeding) [K92.2] Acute hepatic encephalopathy [K72.00] Hyperkalemia [E87.5] Procedure(s) (LRB): ESOPHAGOGASTRODUODENOSCOPY (EGD)/ BMI 24 ROOM 206 (N/A) 2 Days Post-Op Reason for Referral: ICD-10: Treatment Diagnosis: Generalized Muscle Weakness (M62.81) [place]PAT[place]: [place] / [place] B / Product Type: Medicare /  
 
ASSESSMENT:  
 
REHAB RECOMMENDATIONS:  
Recommendation to date pending progress: 
[address] [place] [address] Therapy vs none Equipment:  To Be Determined PRIOR LEVEL OF FUNCTION: 
(Prior to Hospitalization) INITIAL/CURRENT LEVEL OF FUNCTION: 
(Most Recently Demonstrated) Bed Mobility: 
 Independent Sit to Stand:  Independent Transfers:  Independent Gait/Mobility: 
 Independent Bed Mobility: 
Iqbal McClain Sit to Stand: 
Iqbal McClain Transfers:  Not tested Gait/Mobility:  Not tested ASSESSMENT: 
Mr. Laney Laguerre is a 70year old male admitted with a GI bleed and AMS.  He has a PMH that includes cirrhosis and varices and paracentesis. Pt presents supine in bed with wife and daughter present and states that he doesn't want to get up and walk. He agrees to sitting EOB for evaluation. Pt demonstrates good functional mobility getting to EOB with SBA and is roshni to maintain sitting tolerance without assistance. He performed sit to stand with SBA and no device and was able to take a few steps up to the head of the bed. Pt demonstrates generalized weakness with gross strength of 3+/4 bilaterally and some difficulty with following commands in part due to being [facility]. Pt is functioning below his baseline at this time and would benefit from one additional session to further assess gait and stability. SUBJECTIVE:  
Mr. Edilberto Jones states, \"I really don't want to walk, I'm trying to sleep right now. \" 
 
SOCIAL HISTORY/LIVING ENVIRONMENT: Pt lives in a single level home with his wife, 2 steps to enter the kitchen, no falls, no use of AD,drives and is independent with all ADLs OBJECTIVE:  
 
PAIN: VITAL SIGNS: LINES/DRAINS:  
Pre Treatment: Pain Screen Pain Scale 1: Numeric (0 - 10) Pain Intensity 1: 0 Post Treatment: 0   none O2 Device: CO2 nasal cannula GROSS EVALUATION: 
 Within Functional Limits Abnormal/ Functional Abnormal/ Non-Functional (see comments) Not Tested Comments: AROM [x] [] [] [] PROM [] [] [] [] Strength [] [x] [] [] Grossly 3+/5 BLE Balance [x] [] [] [] Posture [x] [] [] [] Sensation [x] [] [] [] Coordination [] [] [] [] Tone [] [] [] [] Edema [] [] [] [] Activity Tolerance [] [x] [] [] lethargic  
 [] [] [] [] COGNITION/ 
PERCEPTION: Intact Impaired  
(see comments) Comments:  
Orientation [x] [] Vision [x] [] Hearing [] [x] Command Following [x] [] Safety Awareness [x] []   
 [] [] MOBILITY: I Mod I S SBA CGA Min Mod Max Total  NT x2 Comments:  
Bed Mobility Rolling [] [] [] [x] [] [] [] [] [] [] [] Supine to Sit [] [] [] [x] [] [] [] [] [] [] [] Scooting [] [] [] [] [] [] [] [] [] [x] [] Sit to Supine [] [] [] [x] [] [] [] [] [] [] []   
Transfers Sit to Stand [] [] [] [x] [] [] [] [] [] [] [] Bed to Chair [] [] [] [] [] [] [] [] [] [x] [] Stand to Sit [] [] [] [x] [] [] [] [] [] [] [] I=Independent, Mod I=Modified Independent, S=Supervision, SBA=Standby Assistance, CGA=Contact Guard Assistance,  
Min=Minimal Assistance, Mod=Moderate Assistance, Max=Maximal Assistance, Total=Total Assistance, NT=Not Tested GAIT: I Mod I S SBA CGA Min Mod Max Total  NT x2 Comments:  
Level of Assistance [] [] [] [] [] [] [] [] [] [x] [] 2 lateral steps without AD taken to [facility] Distance DME N/A Gait Quality Weightbearing Status N/A I=Independent, Mod I=Modified Independent, S=Supervision, SBA=Standby Assistance, CGA=Contact Guard Assistance,  
Min=Minimal Assistance, Mod=Moderate Assistance, Max=Maximal Assistance, Total=Total Assistance, NT=Not Tested [facility] Basic Mobility Inpatient Short Form How much difficulty does the patient currently have. .. Unable A Lot A Little None 1. Turning over in bed (including adjusting bedclothes, sheets and blankets)? [] 1   [] 2   [] 3   [x] 4  
2. Sitting down on and standing up from a chair with arms ( e.g., wheelchair, bedside commode, etc.)   [] 1   [] 2   [x] 3   [] 4  
3. Moving from lying on back to sitting on the side of the bed? [] 1   [] 2   [] 3   [x] 4 How much help from another person does the patient currently need. .. Total A Lot A Little None 4. Moving to and from a bed to a chair (including a wheelchair)? [] 1   [] 2   [x] 3   [] 4  
5. Need to walk in hospital room? [] 1   [] 2   [x] 3   [] 4  
6. Climbing 3-5 steps with a railing? [] 1   [x] 2   [] 3   [] 4  
© 2007, Trustees of [facility] [employer], under license to [facility]. All rights reserved Score:  Initial: 19 Most Recent: X (Date: -- ) Interpretation of Tool:  Represents activities that are increasingly more difficult (i.e. Bed mobility, Transfers, Gait). PLAN:  
FREQUENCY/DURATION: PT Plan of Care:  (One additional visit for mobility/gait) for duration of hospital stay or until stated goals are met, whichever comes first. 
 
PROBLEM LIST:  
(Skilled intervention is medically necessary to address:) 1. Decreased Strength INTERVENTIONS PLANNED:  
(Benefits and precautions of physical therapy have been discussed with the patient.) 1. Therapeutic Activity 2. Therapeutic Exercise/HEP 3. Neuromuscular Re-education 4. Gait Training 5. Manual Therapy 6. Education TREATMENT:  
 
EVALUATION: Low Complexity : (Untimed Charge) TREATMENT:  
(     ) None this visit TREATMENT GRID: 
N/A 
 
AFTER TREATMENT POSITION/PRECAUTIONS: 
Bed, Needs within reach, RN notified and Visitors at bedside INTERDISCIPLINARY COLLABORATION: 
RN/PCT and PT/PTA TOTAL TREATMENT DURATION: 
PT Patient Time In/Time Out Time In: 0569 Time Out: [address], [place]

## 2021-05-21 NOTE — PROGRESS NOTES
[facility] Hospitalist Progress Note Name:  Jose Ramon Steven  Age:75 y.o. Sex:male :  1945 MRN:  [id number] Admit Date:  2021 Reason for Admission: 
GIB (gastrointestinal bleeding) [K92.2] Acute hepatic encephalopathy [K72.00] Hyperkalemia [E87.5] Assessment & Plan Grade 2 Varices without bleeding 
-Patient has a positive fecal occult 
-Stool is not grossly black 
-We will monitor H&H and transfuse as appropriate 
-GI consulted 
-Continue IV Protonix 
-EGD per them 
-N.p.o. for now 
-Serial H&H 
-Transfuse for any hemoglobin less than 7 
21 EGD showing EGD showing esophageal varices G2 without bleeding; Moderate PHG. No source of bleeding found. Hgb holding at 10.1 and will be monitored daily. 2G Na diet/protonix 21  Palliative has seen patient and he is now considering hospice care. Will discuss with family. Hgb 9.8 and will be monitored 
 21 Patient desires to be Full Code at this time. Has requested more information concerning hospice which was provided by CM. PT/OT eval pending Hepatic encephalopathy 
-We will give lactulose 
-Monitor his ammonia levels  
-He is nonfocal. 
-Low threshold for CT head and further indicated. 21 Resolving;  Patient has been declining before hospitalization per family; likely 2/2 elevated ammonia levels. Continue lactulose and monitor 21 Resolved; Continue Lactulose. He will need long term 
  
Hyperkalemia 
-He got calcium gluconate in the ER we will continue him on remote telemetry at this time  
-We will also give dextrose and insulin 
-Continue dextrose in setting of n.p.o. status secondary to GI bleed with plans for possible EGD in the a.m. 
-Repeat potassium levels today 21 Resolved K+ 5.0 and will be monitored 
  
Hyponatremia 21 Na 127; likely 2/2 volume overload with ascites;  Fluid restriction; urine Na and urine osmol pending; IR for paracentesis prn 
21 Continuing slow correction; now 128; urine Na/Osmol pending 5/21/21 Na chronically low; Will monitor with fluid restrictions Bleeding from umbilicus 
-This was Dermabond in the ER 
-Likely the source of significant bleeding per them 
-We will monitor 5/19/21 Wound care consulted; wound with serosanguinous drainage 5/20/21 Wound team did see patient  with wound care as directed  
5/21/21 Scant drainage noted; Hgb 9.8 Cirrhosis of the liver 
-Continue appropriate medical management 
-GI input appreciated 
-They plan to get the right upper quadrant ultrasound to check the patient's gallbladder that he canceled and refused to have done per family.  Will also be appropriate for hepatocellular's screening 5/19/21 Abd US showing large volume ascites, patent portal veins and cholelithiasis. No suspicious hepatic masses. Patient has declined pursuing liver transplant. Continue Lactulose to a goal of 3 BMs per day 
 Palliative consult to establish [address] and possible hospice consult. Patient does have poor prognosis per GI; appreciate assistance 5/19/21 Restart lasix/spironalcatone  in a.m. 
5/20/21 Patient is considering hospice. Will continue to treat as above 
  
  
 
 
 
 
Diet:  DIET CARDIAC 
DVT PPx: SCDs GI Ppx: Protonix Code: Full Code Dispo/Discharge Planning: Dispo pending Hospital Course/Subjective:  
Patient is a 70 y. o. male with medical h/o significant for cirrhosis and varices with a history of paracentesis. He presented to the ER with a complaint of fatigue and some blood in the toilet with a concern for rectal bleeding. Denied any hematemesis or any other associated symptoms.  He was scheduled for a right upper quadrant US and liver biopsy but had to cancel due to his spouse being hospitalized and fear of what would be found in the biopsy.   
  
 
 
Subjective/24 hr Events (05/21/21): Review of Systems: 14 point review of systems is otherwise negative with the exception of the elements mentioned above. Objective: Patient Vitals for the past 24 hrs: 
 Temp Pulse Resp BP SpO2  
05/21/21 0741 97.9 °F (36.6 °C) 79 17 (!) 105/59 99 % 05/21/21 0613  77     
05/21/21 0244 98.3 °F (36.8 °C) 83 18 108/61 97 % 05/21/21 0150  79     
05/20/21 2350 98.3 °F (36.8 °C) 78 18 97/69 95 % 05/20/21 1912 98 °F (36.7 °C) 78 18 91/60 97 % 05/20/21 1525 97.4 °F (36.3 °C) 81 18 116/73 99 % 05/20/21 1130 97.7 °F (36.5 °C) 80 18 115/72 96 % Oxygen Therapy O2 Sat (%): 99 % (05/21/21 0741) Pulse via Oximetry: 106 beats per minute (05/19/21 1147) O2 Device: CO2 nasal cannula (05/19/21 1147) O2 Flow Rate (L/min): 4 l/min (05/20/21 0032) Body mass index is 24.97 kg/m². Physical Exam:  
General: No acute distress, speaking in full sentences, no use of accessory muscles HEENT: Pupils equal and reactive to light and accommodation, oropharynx is clear Neck: Supple, no lymphadenopathy, no JVD Lungs: Clear to auscultation bilaterally Cardiovascular: Regular rate and rhythm; chronic murmur Abdomen: Soft, nontender, distended, normoactive bowel sounds Extremities: No cyanosis clubbing or edema Neuro: Nonfocal, A&O x3  
Psych: Normal affect Data Review: 
I have reviewed all labs, meds, and studies from the last 24 hours: 
 
Labs: 
 
Recent Results (from the past 24 hour(s)) METABOLIC PANEL, BASIC Collection Time: 05/21/21  4:24 AM  
Result Value Ref Range Sodium 126 (L) 138 - 145 mmol/L Potassium 5.0 3.5 - 5.1 mmol/L Chloride 96 (L) 98 - 107 mmol/L  
 CO2 23 21 - 32 mmol/L Anion gap 7 7 - 16 mmol/L Glucose 119 (H) 65 - 100 mg/dL BUN 52 (H) 8 - 23 MG/DL Creatinine 1.47 0.8 - 1.5 MG/DL  
 GFR est AA >60 >60 ml/min/1.73m2 GFR est non-AA 50 (L) >60 ml/min/1.73m2 Calcium 8.2 (L) 8.3 - 10.4 MG/DL All Micro Results None EKG Results Procedure 720 Value Units Date/Time EKG, 12 LEAD, SUBSEQUENT [[id number]] Collected: 05/18/21 1224  Order Status: Completed Updated: 05/18/21 1549 Ventricular Rate 111 BPM   
  Atrial Rate 111 BPM   
  P-R Interval 160 ms QRS Duration 76 ms   
  Q-T Interval 320 ms QTC Calculation (Bezet) 435 ms Calculated P Axis 68 degrees Calculated R Axis 12 degrees Calculated T Axis 68 degrees Diagnosis --  
  !! AGE AND GENDER SPECIFIC ECG ANALYSIS !! Sinus tachycardia Otherwise normal ECG When compared with ECG of 18-MAY-2021 10:40, No significant change was found Confirmed by [facility][1]  MD ()Serafin ([id number]) on 5/18/2021 3:48:40 PM 
  
 EKG 12 LEAD INITIAL [[id number]] Collected: 05/18/21 1040 Order Status: Completed Updated: 05/18/21 1544 Ventricular Rate 115 BPM   
  Atrial Rate 115 BPM   
  P-R Interval 156 ms QRS Duration 70 ms Q-T Interval 302 ms QTC Calculation (Bezet) 417 ms Calculated P Axis 61 degrees Calculated R Axis 2 degrees Calculated T Axis 79 degrees Diagnosis --  
  !! AGE AND GENDER SPECIFIC ECG ANALYSIS !! Sinus tachycardia 
possible  Inferior infarct , age undetermined 
low voltage Abnormal ECG No previous ECGs available Confirmed by [facility][1]  MD ()Serafin ([id number]) on 5/18/2021 3:43:43 PM 
  
  
 
 
Other Studies: XR CHEST PA LAT Result Date: 5/18/2021 EXAM: XR CHEST PA LAT INDICATION: weakness COMPARISON: None FINDINGS: The cardiomediastinal silhouette is within normal limits. Atherosclerotic calcifications in the aorta. No pleural effusion or pneumothorax. Pulmonary venous congestion with suggestion of increased interstitial opacities with bibasilar predominance. No focal consolidation. Osseous structures are grossly intact with diffuse osteopenia. 1. Pulmonary venous congestion with probable mild interstitial pulmonary edema. 2. Atherosclerosis. US ABD LTD Result Date: 5/18/2021 Exam: Right upper quadrant ultrasound.  Indication: Nausea, elevated LFTs, cirrhosis Comparison: Abdominal ultrasound, 8/2/2018 Findings: Pancreas: Not well visualized due to shadowing bowel gas. Liver: The liver demonstrates nodular contour and heterogeneous echotexture throughout. No suspicious hepatic masses. The main portal vein is patent with an appropriate direction of flow and velocity. Gallbladder: Gallbladder wall is normal in thickness. The gallbladder is not distended. There is cholelithiasis. No pericholecystic fluid. Absent sonographic Jordan sign per the sonographer. Biliary: No intra or extrahepatic biliary duct dilatation. Right kidney: The right kidney is normal in echogenicity and normal in size measuring 9.9 cm. No contour deforming mass. No hydronephrosis or perinephric fluid. Abdominal Aorta: Nonaneurysmal in the visualized portion. Inferior Vena Cava: Patent. Ascites: Large volume ascites. 1. Morphologic changes of hepatic cirrhosis with large volume of abdominopelvic ascites. 2. The portal veins remain patent with appropriate direction of flow. 3. Cholelithiasis without acute cholecystitis. Current Meds:  
Current Facility-Administered Medications Medication Dose Route Frequency  pantoprazole (PROTONIX) tablet 40 mg  40 mg Oral ACB  propranoloL (INDERAL) tablet 20 mg  20 mg Oral TID  traMADoL (ULTRAM) tablet 50 mg  50 mg Oral Q6H PRN  
 furosemide (LASIX) tablet 40 mg  40 mg Oral DAILY  spironolactone (ALDACTONE) tablet 25 mg  25 mg Oral DAILY  sodium chloride (NS) flush 5-40 mL  5-40 mL IntraVENous Q8H  
 sodium chloride (NS) flush 5-40 mL  5-40 mL IntraVENous PRN  
 ondansetron (ZOFRAN) injection 4 mg  4 mg IntraVENous Q4H PRN  
 lactulose (CHRONULAC) 10 gram/15 mL solution 30 mL  20 g Oral TID Problem List: 
Hospital Problems as of 5/21/2021 Date Reviewed: 4/22/2021 Codes Class Noted - Resolved POA Hyperkalemia ICD-10-CM: E87.5 ICD-9-CM: 276.7  5/18/2021 - Present Unknown GIB (gastrointestinal bleeding) ICD-10-CM: K92.2 ICD-9-CM: 578.9  5/18/2021 - Present Unknown  Acute hepatic encephalopathy ICD-10-CM: K72.00 ICD-9-CM: 572.2  5/18/2021 - Present Unknown Part of this note was written by using a voice dictation software and the note has been proof read but may still contain some grammatical/other typographical errors. Signed By: Sebastian Garrido  [facility] Service  May 21, 2021  11:17 AM

## 2021-05-21 NOTE — PROGRESS NOTES
Problem: Pressure Injury - Risk of 
Goal: *Prevention of pressure injury Description: Document Rio Scale and appropriate interventions in the flowsheet. Outcome: Progressing Towards Goal 
Note: Pressure Injury Interventions: 
  
 
Moisture Interventions: Apply protective barrier, creams and emollients, Check for incontinence Q2 hours and as needed, Internal/External urinary devices, Limit adult briefs, Minimize layers Activity Interventions: Increase time out of bed, Pressure redistribution bed/mattress(bed type), PT/OT evaluation Mobility Interventions: Assess need for specialty bed, HOB 30 degrees or less, Pressure redistribution bed/mattress (bed type), PT/OT evaluation, Trapeze to reposition Nutrition Interventions: Document food/fluid/supplement intake Problem: Patient Education: Go to Patient Education Activity Goal: Patient/Family Education Outcome: Progressing Towards Goal 
  
Problem: Falls - Risk of 
Goal: *Absence of Falls Description: Document Venus Schrader Fall Risk and appropriate interventions in the flowsheet. Outcome: Progressing Towards Goal 
Note: Fall Risk Interventions: 
Mobility Interventions: Communicate number of staff needed for ambulation/transfer, Patient to call before getting OOB Medication Interventions: Evaluate medications/consider consulting pharmacy, Patient to call before getting OOB, Teach patient to arise slowly Elimination Interventions: Bed/chair exit alarm, Call light in reach, Patient to call for help with toileting needs Problem: Patient Education: Go to Patient Education Activity Goal: Patient/Family Education Outcome: Progressing Towards Goal

## 2021-05-21 NOTE — PROGRESS NOTES
END OF SHIFT NOTE: 
 
INTAKE/OUTPUT 
05/20 0701 - 05/21 0700 In: 840 [P.O.:840] Out: 1100 [Urine:1100] Voiding: YES Catheter: NO 
Drain:   
 
 
 
 
 
Flatus: Patient does have flatus present. Stool:  0 occurrences. Characteristics: 
  
 
Emesis: 0 occurrences. Characteristics: VITAL SIGNS Patient Vitals for the past 12 hrs: 
 Temp Pulse Resp BP SpO2  
05/21/21 1550 98 °F (36.7 °C) 81 17 110/66 98 % 05/21/21 1122 97.8 °F (36.6 °C) 77 18 114/78 99 % Pain Assessment Pain Intensity 1: 0 (05/21/21 1500) Pain Location 1: Back Pain Intervention(s) 1: Position, Rest 
Patient Stated Pain Goal: 0 Ambulating Yes Shift report given to oncoming nurse at the bedside.  
 
Eren Askew RN

## 2021-05-22 NOTE — PROGRESS NOTES
[facility]ist Progress Note Name:  Theresa Gomez  Age:75 y.o. Sex:male :  1945 MRN:  [id number] Admit Date:  2021 Reason for Admission: 
GIB (gastrointestinal bleeding) [K92.2] Acute hepatic encephalopathy [K72.00] Hyperkalemia [E87.5] Assessment & Plan Grade 2 Varices without bleeding 
-Patient has a positive fecal occult 
-Stool is not grossly black 
-We will monitor H&H and transfuse as appropriate 
-GI consulted 
-Continue IV Protonix 
-EGD per them 
-N.p.o. for now 
-Serial H&H 
-Transfuse for any hemoglobin less than 7 
21 EGD showing EGD showing esophageal varices G2 without bleeding; Moderate PHG. No source of bleeding found. Hgb holding at 10.1 and will be monitored daily. 2G Na diet/protonix 21  Palliative has seen patient and he is now considering hospice care. Will discuss with family. Hgb 9.8 and will be monitored 
 21 Patient desires to be Full Code at this time. Has requested more information concerning hospice which was provided by CM. PT/OT eval pending 21 Hospice consulted with recs pending; CBC daily, control BP Hepatic encephalopathy 
-We will give lactulose 
-Monitor his ammonia levels  
-He is nonfocal. 
-Low threshold for CT head and further indicated. 21 Resolving;  Patient has been declining before hospitalization per family; likely 2/2 elevated ammonia levels. Continue lactulose and monitor 21 Resolved; Continue Lactulose. He will need long term 
  
Hyperkalemia 
-He got calcium gluconate in the ER we will continue him on remote telemetry at this time  
-We will also give dextrose and insulin 
-Continue dextrose in setting of n.p.o. status secondary to GI bleed with plans for possible EGD in the a.m. 
-Repeat potassium levels today 21 Resolved K+ 5.0 and will be monitored 
  
Hyponatremia 21 Na 127; likely 2/2 volume overload with ascites;  Fluid restriction; urine Na and urine osmol pending; IR for paracentesis prn 
21 Continuing slow correction; now 128; urine Na/Osmol pending 21 Na chronically low; Will monitor with fluid restrictions 
  
Bleeding from umbilicus 
-This was Dermabond in the ER 
-Likely the source of significant bleeding per them 
-We will monitor 21 Wound care consulted; wound with serosanguinous drainage 21 Wound team did see patient  with wound care as directed  
21 Scant drainage noted; Hgb 9.8  
 21 No drainage on dsg;  Monitor CBC Cirrhosis of the liver 
-Continue appropriate medical management 
-GI input appreciated 21 Abd US showing large volume ascites, patent portal veins and cholelithiasis. No suspicious hepatic masses. Patient has declined pursuing liver transplant. Continue Lactulose to a goal of 3 BMs per day 
 Palliative consult to establish [address] and possible hospice consult. Patient does have poor prognosis per GI; appreciate assistance 21 Restart lasix/spironalcatone  in a.m. 
21 Patient is considering hospice. Will continue to treat as above 21 Hospice consulted. Hospice vs HH. Patient is unsure if he wants any further evaluation at this time Monitor electrolytes, Cr   
  
 
 
 
 
Diet:  DIET CARDIAC 
DVT PPx: SCDs GI Ppx: Protonix Code: Full Code Dispo/Discharge Plannin-48 hours Hospital Course/Subjective:  
Patient is a 70 y. o. male with medical h/o significant for cirrhosis and varices with a history of paracentesis. He presented to the ER with a complaint of fatigue and some blood in the toilet with a concern for rectal bleeding. Denied any hematemesis or any other associated symptoms.  He was scheduled for a right upper quadrant US and liver biopsy but had to cancel due to his spouse being hospitalized and fear of what would be found in the biopsy.   
  
 
 
Subjective/24 hr Events (21): Patient denies any pain/distress. Reports good appetite.  He states he is unsure of hospice at this time and will need more time to make decision Review of Systems: 14 point review of systems is otherwise negative with the exception of the elements mentioned above. Objective:  
 
Patient Vitals for the past 24 hrs: 
 Temp Pulse Resp BP SpO2  
05/22/21 0739 98 °F (36.7 °C) 84 18 112/68 99 % 05/22/21 0312 98 °F (36.7 °C) 82 17 98/65 96 % 05/21/21 2331 98.4 °F (36.9 °C) 86 17 97/61 97 % 05/21/21 2043 98 °F (36.7 °C) 84 17 122/79 99 % 05/21/21 1550 98 °F (36.7 °C) 81 17 110/66 98 % 05/21/21 1122 97.8 °F (36.6 °C) 77 18 114/78 99 % Oxygen Therapy O2 Sat (%): 99 % (05/22/21 0739) Pulse via Oximetry: 106 beats per minute (05/19/21 1147) O2 Device: CO2 nasal cannula (05/19/21 1147) O2 Flow Rate (L/min): 4 l/min (05/20/21 0032) Body mass index is 25.4 kg/m². Physical Exam:  
General: Ill appearing; No acute distress, speaking in full sentences, no use of accessory muscles HEENT: Pupils equal; oropharynx is clear Neck: Supple, no lymphadenopathy, no JVD Lungs: Clear to auscultation bilaterally Cardiovascular: Regular rate and rhythm with normal S1 and S2 Abdomen: Soft, nontender, distended, normoactive bowel sounds Extremities: No cyanosis clubbing or edema Neuro: Nonfocal, A&O x3  
Psych: Normal affect Data Review: 
I have reviewed all labs, meds, and studies from the last 24 hours: 
 
Labs: 
 
Recent Results (from the past 24 hour(s)) METABOLIC PANEL, BASIC Collection Time: 05/22/21  4:49 AM  
Result Value Ref Range Sodium 128 (L) 136 - 145 mmol/L Potassium 4.7 3.5 - 5.1 mmol/L Chloride 98 98 - 107 mmol/L  
 CO2 23 21 - 32 mmol/L Anion gap 7 7 - 16 mmol/L Glucose 111 (H) 65 - 100 mg/dL BUN 50 (H) 8 - 23 MG/DL Creatinine 1.45 0.8 - 1.5 MG/DL  
 GFR est AA >60 >60 ml/min/1.73m2 GFR est non-AA 51 (L) >60 ml/min/1.73m2 Calcium 8.3 8.3 - 10.4 MG/DL All Micro Results None EKG Results Procedure 720 Value Units Date/Time EKG, 12 LEAD, SUBSEQUENT [[id number]] Collected: 05/18/21 1224 Order Status: Completed Updated: 05/18/21 1549 Ventricular Rate 111 BPM   
  Atrial Rate 111 BPM   
  P-R Interval 160 ms QRS Duration 76 ms   
  Q-T Interval 320 ms QTC Calculation (Bezet) 435 ms Calculated P Axis 68 degrees Calculated R Axis 12 degrees Calculated T Axis 68 degrees Diagnosis --  
  !! AGE AND GENDER SPECIFIC ECG ANALYSIS !! Sinus tachycardia Otherwise normal ECG When compared with ECG of 18-MAY-2021 10:40, No significant change was found Confirmed by Yari Engle MD (), Kylee Powell ([id number]) on 5/18/2021 3:48:40 PM 
  
 EKG 12 LEAD INITIAL [[id number]] Collected: 05/18/21 1040 Order Status: Completed Updated: 05/18/21 1544 Ventricular Rate 115 BPM   
  Atrial Rate 115 BPM   
  P-R Interval 156 ms QRS Duration 70 ms Q-T Interval 302 ms QTC Calculation (Bezet) 417 ms Calculated P Axis 61 degrees Calculated R Axis 2 degrees Calculated T Axis 79 degrees Diagnosis --  
  !! AGE AND GENDER SPECIFIC ECG ANALYSIS !! Sinus tachycardia 
possible  Inferior infarct , age undetermined 
low voltage Abnormal ECG No previous ECGs available Confirmed by Yari Engle MD (), Kylee Powell ([id number]) on 5/18/2021 3:43:43 PM 
  
  
 
 
Other Studies: XR CHEST PA LAT Result Date: 5/18/2021 EXAM: XR CHEST PA LAT INDICATION: weakness COMPARISON: None FINDINGS: The cardiomediastinal silhouette is within normal limits. Atherosclerotic calcifications in the aorta. No pleural effusion or pneumothorax. Pulmonary venous congestion with suggestion of increased interstitial opacities with bibasilar predominance. No focal consolidation. Osseous structures are grossly intact with diffuse osteopenia. 1. Pulmonary venous congestion with probable mild interstitial pulmonary edema. 2. Atherosclerosis. US ABD LTD Result Date: 5/18/2021 Exam: Right upper quadrant ultrasound. Indication: Nausea, elevated LFTs, cirrhosis Comparison: Abdominal ultrasound, 8/2/2018 Findings: Pancreas: Not well visualized due to shadowing bowel gas. Liver: The liver demonstrates nodular contour and heterogeneous echotexture throughout. No suspicious hepatic masses. The main portal vein is patent with an appropriate direction of flow and velocity. Gallbladder: Gallbladder wall is normal in thickness. The gallbladder is not distended. There is cholelithiasis. No pericholecystic fluid. Absent sonographic Jordan sign per the sonographer. Biliary: No intra or extrahepatic biliary duct dilatation. Right kidney: The right kidney is normal in echogenicity and normal in size measuring 9.9 cm. No contour deforming mass. No hydronephrosis or perinephric fluid. Abdominal Aorta: Nonaneurysmal in the visualized portion. Inferior Vena Cava: Patent. Ascites: Large volume ascites. 1. Morphologic changes of hepatic cirrhosis with large volume of abdominopelvic ascites. 2. The portal veins remain patent with appropriate direction of flow. 3. Cholelithiasis without acute cholecystitis. Current Meds:  
Current Facility-Administered Medications Medication Dose Route Frequency  pantoprazole (PROTONIX) tablet 40 mg  40 mg Oral ACB  propranoloL (INDERAL) tablet 20 mg  20 mg Oral TID  traMADoL (ULTRAM) tablet 50 mg  50 mg Oral Q6H PRN  
 furosemide (LASIX) tablet 40 mg  40 mg Oral DAILY  spironolactone (ALDACTONE) tablet 25 mg  25 mg Oral DAILY  sodium chloride (NS) flush 5-40 mL  5-40 mL IntraVENous Q8H  
 sodium chloride (NS) flush 5-40 mL  5-40 mL IntraVENous PRN  
 ondansetron (ZOFRAN) injection 4 mg  4 mg IntraVENous Q4H PRN  
 lactulose (CHRONULAC) 10 gram/15 mL solution 30 mL  20 g Oral TID Problem List: 
Hospital Problems as of 5/22/2021 Date Reviewed: 4/22/2021 Codes Class Noted - Resolved POA Hyperkalemia ICD-10-CM: E87.5 ICD-9-CM: 276.7  5/18/2021 - Present Unknown GIB (gastrointestinal bleeding) ICD-10-CM: K92.2 ICD-9-CM: 578.9  5/18/2021 - Present Unknown Acute hepatic encephalopathy ICD-10-CM: K72.00 ICD-9-CM: 572.2  5/18/2021 - Present Unknown Part of this note was written by using a voice dictation software and the note has been proof read but may still contain some grammatical/other typographical errors. Signed By: Cammy Washburn  [facility]  May 22, 2021  9:40 AM

## 2021-05-22 NOTE — HOSPICE
Mrs. Maame Sadler did return call. She would like to have time to talk with Mr. Maame Sadler first. Wife states Adam Man is in complete denial\". She will call me back if patient agrees to having a \"meeting\" together to discuss Hospice Philosophy and the services we offer. Juliann Jacques RN BSN [facility] [phone]

## 2021-05-22 NOTE — PROGRESS NOTES
Problem: Falls - Risk of 
Goal: *Absence of Falls Description: Document Adrienne Lenz Fall Risk and appropriate interventions in the flowsheet. Outcome: Progressing Towards Goal 
Note: Fall Risk Interventions: 
Mobility Interventions: Bed/chair exit alarm, Patient to call before getting OOB, Communicate number of staff needed for ambulation/transfer, PT Consult for mobility concerns, Utilize walker, cane, or other assistive device Mentation Interventions: Door open when patient unattended, Bed/chair exit alarm, Adequate sleep, hydration, pain control, Eyeglasses and hearing aids, More frequent rounding, Room close to nurse's station, Self-releasing belt, Toileting rounds, Increase mobility Medication Interventions: Patient to call before getting OOB, Evaluate medications/consider consulting pharmacy, Teach patient to arise slowly, Bed/chair exit alarm Elimination Interventions: Elevated toilet seat, Call light in reach, Patient to call for help with toileting needs, Stay With Me (per policy), Toileting schedule/hourly rounds, Toilet paper/wipes in reach, Urinal in reach History of Falls Interventions: Investigate reason for fall, Evaluate medications/consider consulting pharmacy, Room close to nurse's station, Utilize gait belt for transfer/ambulation, Door open when patient unattended

## 2021-05-22 NOTE — PROGRESS NOTES
ACUTE OT GOALS: 
(Developed with and agreed upon by patient and/or caregiver.) 1. Pt will toilet with SBA 2. Pt will complete functional mobility for ADLs with SBA 3. Pt will complete lower body dressing with SBA using AE as needed 4. Pt will complete grooming and hygiene at sink with SBA 5. Pt will demonstrate independence with HEP to promote increased BUE strength and functional use for ADLs 6. Pt will tolerate 23 minutes functional activity with min or fewer rest breaks to promote increased endurance for ADLs 7. Pt will independently demonstrate/ verbalize 2+ energy conservation techniques to promote increased endurance for ADLs Timeframe: 7 days OCCUPATIONAL THERAPY ASSESSMENT: Initial Assessment and Daily Note OT Treatment Day # 1 Nanda Davies is a 70 y.o. male PRIMARY DIAGNOSIS: <principal problem not specified> 
GIB (gastrointestinal bleeding) [K92.2] Acute hepatic encephalopathy [K72.00] Hyperkalemia [E87.5] Procedure(s) (LRB): ESOPHAGOGASTRODUODENOSCOPY (EGD)/ BMI 24 ROOM 206 (N/A) 3 Days Post-Op Reason for Referral:  AMS, weakness, impaired ADLs ICD-10: Treatment Diagnosis: Generalized Muscle Weakness (M62.81) [place]PAT[place]: [place] / [place] / Product Type: Medicare /  
ASSESSMENT:  
 
REHAB RECOMMENDATIONS:  
Recommendation to date pending progress: 
[facility] Equipment:  3 in 1 Bedside Commode  as shower chair PRIOR LEVEL OF FUNCTION: 
(Prior to Hospitalization)  INITIAL/CURRENT LEVEL OF FUNCTION: 
(Based on today's evaluation) Bathing: 
 Independent Dressing:  Independent Feeding/Grooming:  Independent Toileting:  Independent Functional Mobility: 
 Independent Bathing:  Minimal Assistance Dressing:  Minimal Assistance Feeding/Grooming:  Minimal Assistance Toileting:  Minimal Assistance Functional Mobility: 
[facility] ASSESSMENT: 
Mr. Yousif Gates presented generally weak with deficits in cognition, strength, mobility, endurance, and balance impacting ADLs. Pt very limited by cognitive deficits with impaired attention, problem solving, safety awareness, processing, and memory and required min-mod cues to successfully complete all tasks and maintain safety. Pt mobilized with CGA overall, unsteady and did better with use of RW. Pt is below his functional baseline and would benefit from skilled OT services to address deficits. SUBJECTIVE:  
Mr. Jonathan Maldonado states, \"I'm doing okay. \" SOCIAL HISTORY/LIVING ENVIRONMENT: Lives with wife, independent, does not use an AD for mobility. Walk in shower. Has RW OBJECTIVE:  
 
PAIN: VITAL SIGNS: LINES/DRAINS:  
Pre Treatment: Pain Screen Pain Scale 1: Numeric (0 - 10) Pain Intensity 1: 0 Post Treatment: 0    
O2 Device: CO2 nasal cannula GROSS EVALUATION: 
BUE Within Functional Limits Abnormal/ Functional Abnormal/ Non-Functional (see comments) Not Tested Comments: AROM [x] [] [] [] PROM [] [] [] [] Strength [] [x] [] [] Balance [] [x] [] [] Posture [] [] [] [] Sensation [] [] [] [] Coordination [x] [] [] [] Tone [] [] [] [] Edema [] [] [] [] Activity Tolerance [] [x] [] []   
 [] [] [] [] COGNITION/ 
PERCEPTION: Intact Impaired  
(see comments) Comments:  
Orientation [x] [] Vision [] [] Hearing [] [] Judgment/ Insight [] [x] Attention [] [x] Memory [] [x] Command Following [] [x] Emotional Regulation [x] []   
 [] [] ACTIVITIES OF DAILY LIVING: I Mod I S SBA CGA Min Mod Max Total NT Comments BASIC ADLs:             
Bathing/ Showering [] [] [] [] [] [] [] [] [] [] Toileting [] [] [] [] [] [] [] [] [] [] Dressing [] [] [] [x] [] [] [] [] [] [] Socks Feeding [] [] [] [] [] [] [] [] [] []   
Grooming [] [] [] [] [] [x] [] [] [] [] Personal Device Care [] [] [] [] [] [] [] [] [] [] Functional Mobility [] [] [] [] [x] [] [] [] [] [] I=Independent, Mod I=Modified Independent, S=Supervision, SBA=Standby Assistance, CGA=Contact Guard Assistance,  
Min=Minimal Assistance, Mod=Moderate Assistance, Max=Maximal Assistance, Total=Total Assistance, NT=Not Tested MOBILITY: I Mod I S SBA CGA Min Mod Max Total  NT x2 Comments:  
Supine to sit [] [] [] [x] [] [] [] [] [] [] [] Sit to supine [] [] [] [] [] [] [] [] [] [] [] Sit to stand [] [] [] [] [x] [] [] [] [] [] [] Bed to chair [] [] [] [] [x] [] [] [] [] [] [] I=Independent, Mod I=Modified Independent, S=Supervision, SBA=Standby Assistance, CGA=Contact Guard Assistance,  
Min=Minimal Assistance, Mod=Moderate Assistance, Max=Maximal Assistance, Total=Total Assistance, NT=Not Tested [facility] Daily Activity Inpatient Short Form How much help from another person does the patient currently need. .. Total A Lot A Little None 1. Putting on and taking off regular lower body clothing? [] 1   [] 2   [x] 3   [] 4  
2. Bathing (including washing, rinsing, drying)? [] 1   [] 2   [x] 3   [] 4  
3. Toileting, which includes using toilet, bedpan or urinal?   [] 1   [] 2   [x] 3   [] 4  
4. Putting on and taking off regular upper body clothing? [] 1   [] 2   [x] 3   [] 4  
5. Taking care of personal grooming such as brushing teeth? [] 1   [] 2   [x] 3   [] 4  
6. Eating meals? [] 1   [] 2   [] 3   [x] 4  
© 2007, Trustees of [address] [place] [address] [address] [id number], under license to [employer]. All rights reserved Score:  Initial: 19 Most Recent: X (Date: -- ) Interpretation of Tool:  Represents activities that are increasingly more difficult (i.e. Bed mobility, Transfers, Gait). PLAN:  
FREQUENCY/DURATION: OT Plan of Care: 3 times/week for duration of hospital stay or until stated goals are met, whichever comes first. 
 
PROBLEM LIST:  
(Skilled intervention is medically necessary to address:) 1. Decreased ADL/Functional Activities 2.  Decreased Activity Tolerance 3. Decreased Balance 4. Decreased Cognition 5. Decreased Strength INTERVENTIONS PLANNED:  
(Benefits and precautions of occupational therapy have been discussed with the patient.) 1. Self Care Training 2. Therapeutic Activity 3. Therapeutic Exercise/HEP 4. Neuromuscular Re-education 5. Education TREATMENT:  
 
EVALUATION: Moderate Complexity : (Untimed Charge) TREATMENT:  
($$ Self Care/Home Management: 8-22 mins    ) Self Care (10 Minutes): Self care including Lower Body Dressing and Grooming to increase independence and decrease level of assistance required. TREATMENT GRID: 
N/A 
 
AFTER TREATMENT POSITION/PRECAUTIONS: 
Alarm Activated, Chair, RN notified and Visitors at bedside INTERDISCIPLINARY COLLABORATION: 
RN/PCT 
 
TOTAL TREATMENT DURATION: 
OT Patient Time In/Time Out Time In: 1013 Time Out: 0698 Brendan Frausto OT

## 2021-05-22 NOTE — HOSPICE
Voice mail left with wife Kesha Rand. Awaiting call back to set up time to meet with spouse and discuss hospice services. Thanks, Lilo Mota RN BSN [facility] [phone]

## 2021-05-22 NOTE — PROGRESS NOTES
END OF SHIFT NOTE: 
 
Hourly rounds conducted. INTAKE/OUTPUT 
05/21 0701 - 05/22 0700 In: 26 [P.O.:460] Out: 200 [Urine:200] Voiding: YES Catheter: NO 
Drain:   
 
 
 
 
 
Flatus: Patient does have flatus present. Stool:  0 occurrences. Characteristics: 
  
 
Emesis: 0 occurrences. Characteristics: VITAL SIGNS Patient Vitals for the past 12 hrs: 
 Temp Pulse Resp BP SpO2  
05/22/21 1543 97.6 °F (36.4 °C) 87 16 106/67 99 % 05/22/21 1533  91  138/85   
05/22/21 1138 98 °F (36.7 °C) 80 15 106/69 100 % 05/22/21 0739 98 °F (36.7 °C) 84 18 112/68 99 % Pain Assessment Pain Intensity 1: 0 (05/22/21 1327) Pain Location 1: Back Pain Intervention(s) 1: Position, Rest 
Patient Stated Pain Goal: 0 Ambulating Yes Shift report given to oncoming nurse at the bedside. Digna Garsia

## 2021-05-22 NOTE — PROGRESS NOTES
ÓSACR has sent referral for pt to [facility] for a presentation of services. Called Mima Elkins hospice oncall RN and left her a message about referral as well.

## 2021-05-23 NOTE — PROGRESS NOTES
END OF SHIFT NOTE: 
 
INTAKE/OUTPUT 
05/22 0701 - 05/23 0700 In: 660 [P.O.:660] Out: 0202 [OUZUS:[id number]] Voiding: YES Catheter: NO 
Drain:   
 
 
 
 
 
Flatus: Patient does have flatus present. Stool:  0 occurrences. Characteristics: 
  
 
Emesis: 0 occurrences. Characteristics: VITAL SIGNS Patient Vitals for the past 12 hrs: 
 Temp Pulse Resp BP SpO2  
05/23/21 0310 97.6 °F (36.4 °C) 76 16 105/60 98 % 05/22/21 2256 97.7 °F (36.5 °C) 74 16 108/64 99 % 05/22/21 1908 97.9 °F (36.6 °C) 81 16 102/65 98 % Pain Assessment Pain Intensity 1: 0 (05/23/21 0200) Pain Location 1: Back Pain Intervention(s) 1: Rest 
Patient Stated Pain Goal: 0 Ambulating Yes Shift report given to oncoming nurse at the bedside.  
 
Hugo Hunt RN

## 2021-05-23 NOTE — PROGRESS NOTES
END OF SHIFT NOTE: 
 
Hourly rounds conducted. At shift change, pt had pain, delayed responses, and increased labored breathing w/no breath sound changes. . Provider notified. Ammonia lab ordered. INTAKE/OUTPUT 
05/22 0701 - 05/23 0700 In: 660 [P.O.:660] Out: 1520 [[id number]] Voiding: YES Catheter: NO 
Drain:   
 
 
 
 
 
Flatus: Patient does have flatus present. Stool:  1 occurrences. Characteristics: 
Stool Assessment Stool Color: Bailey Realitos Stool Appearance: Formed Stool Amount: Large Stool Source/Status: Rectum Emesis: 0 occurrences. Characteristics: VITAL SIGNS Patient Vitals for the past 12 hrs: 
 Temp Pulse Resp BP SpO2  
05/23/21 1620 98.1 °F (36.7 °C) 80 17 118/70 98 % 05/23/21 1145 98.3 °F (36.8 °C) 80 17 116/70 98 % Pain Assessment Pain Intensity 1: 3 (05/23/21 1902) Pain Location 1: Back Pain Intervention(s) 1: Medication (see MAR) Patient Stated Pain Goal: 0 Ambulating Yes Shift report given to oncoming nurse at the bedside. Ann Greco

## 2021-05-23 NOTE — PROGRESS NOTES
Message sent to Hospitalist re: change in condition. Patient is A & O X 4 however has delayed responses and somnolent at shift change. Patient is taking scheduled Lactulose. Had 1 BM in past 2 days. Patient's breathing is slightly labored with clear-diminished lung sounds, 02=98% RA. Order received for Ammonia level.

## 2021-05-23 NOTE — PROGRESS NOTES
NOTED: 
 
NOELLE  
 
FULL CODE 
 
NO ACP ON FILE 
 
EXPECTED D/C  1 DAY 
 
HIGH RISK FOR EOL X 12  MONTHS Will continue to assess how to best serve this family

## 2021-05-23 NOTE — PROGRESS NOTES
[facility] Hospitalist Progress Note Name:  Serenity Whitney  Age:75 y.o. Sex:male :  1945 MRN:  [id number] Admit Date:  2021 Reason for Admission: 
GIB (gastrointestinal bleeding) [K92.2] Acute hepatic encephalopathy [K72.00] Hyperkalemia [E87.5] Hospital Course/Interval history:  
 
Patient is a 70 y. o. male with medical h/o significant for cirrhosis and varices with a history of paracentesis. He presented to the ER with a complaint of fatigue and some blood in the toilet with a concern for rectal bleeding. Denied any hematemesis or any other associated symptoms.  He was scheduled for a right upper quadrant US and liver biopsy but had to cancel due to his spouse being hospitalized and fear of what would be found in the biopsy.  CXR with pulmonary edema. Abd US with cirrhosis with large volume ascites. Underwent EGD with findings of grade 2 varices without bleeding. Subjective (21): 
 
 pt generally not feeling well this morning. Discussed Hospice and he has a good understanding and wishes to proceed however he needs to talk to his wife. Review of Systems: 14 point review of systems is otherwise negative with the exception of the elements mentioned above. Assessment & Plan Grade 2 Varices without bleeding 
-Patient has a positive fecal occult 
-Stool is not grossly black 
-We will monitor H&H and transfuse as appropriate 
-GI consulted 
-Continue IV Protonix 
-EGD per them 
-N.p.o. for now 
-Serial H&H 
-Transfuse for any hemoglobin less than 7 
21 EGD showing EGD showing esophageal varices G2 without bleeding; Moderate PHG. No source of bleeding found. Hgb holding at 10.1 and will be monitored daily. 2G Na diet/protonix 21  Palliative has seen patient and he is now considering hospice care. Will discuss with family. Hgb 9.8 and will be monitored 
 21 Patient desires to be Full Code at this time.  Has requested more information concerning hospice which was provided by [facility]. PT/OT eval pending 5/22/21 Hospice consulted with recs pending; CBC daily, control BP 
  
Hepatic encephalopathy 
-We will give lactulose 
-Monitor his ammonia levels  
-He is nonfocal. 
-Low threshold for CT head and further indicated. 5/19/21 Resolving;  Patient has been declining before hospitalization per family; likely 2/2 elevated ammonia levels. Continue lactulose and monitor 5/20/21 Resolved; Continue Lactulose. He will need long term 
  
Hyperkalemia 
-He got calcium gluconate in the ER we will continue him on remote telemetry at this time  
-We will also give dextrose and insulin 
-Continue dextrose in setting of n.p.o. status secondary to GI bleed with plans for possible EGD in the a.m. 
-Repeat potassium levels today 5/19/21 Resolved K+ 5.0 and will be monitored 
  
Hyponatremia 5/19/21 Na 127; likely 2/2 volume overload with ascites; Fluid restriction; urine Na and urine osmol pending; IR for paracentesis prn 
5/20/21 Continuing slow correction; now 128; urine Na/Osmol pending 5/21/21 Na chronically low; Will monitor with fluid restrictions 
  
Bleeding from umbilicus 
-This was Dermabond in the ER 
-Likely the source of significant bleeding per them 
-We will monitor 5/19/21 Wound care consulted; wound with serosanguinous drainage 5/20/21 Wound team did see patient  with wound care as directed  
5/21/21 Scant drainage noted; Hgb 9.8  
 5/22/21 No drainage on dsg;  Monitor CBC 
  
Cirrhosis of the liver 
-Continue appropriate medical management 
-GI input appreciated 5/19/21 Abd US showing large volume ascites, patent portal veins and cholelithiasis. No suspicious hepatic masses. Patient has declined pursuing liver transplant. Continue Lactulose to a goal of 3 BMs per day 
 Palliative consult to establish [address] and possible hospice consult. Patient does have poor prognosis per GI; appreciate assistance 5/19/21 Restart lasix/spironalcatone  in a.m. 5/20/21 Patient is considering hospice. Will continue to treat as above 5/22/21 Hospice consulted. Hospice vs HH. Patient is unsure if he wants any further evaluation at this time Monitor electrolytes, Cr   
5/23 discussed Hospice in length, pt has good understanding and his desires are to proceed however he would like to talk to his wife.  
  
  
  
  
  
Diet:  DIET CARDIAC 
DVT PPx: SCDs GI Ppx: Protonix Code: Full Code 
  
 
 
 
 
 
 
Objective:  
 
Patient Vitals for the past 24 hrs: 
 Temp Pulse Resp BP SpO2  
05/23/21 0722 98.3 °F (36.8 °C) 81 17 115/76 98 % 05/23/21 0310 97.6 °F (36.4 °C) 76 16 105/60 98 % 05/22/21 2256 97.7 °F (36.5 °C) 74 16 108/64 99 % 05/22/21 1908 97.9 °F (36.6 °C) 81 16 102/65 98 % 05/22/21 1543 97.6 °F (36.4 °C) 87 16 106/67 99 % 05/22/21 1533  91  138/85   
05/22/21 1138 98 °F (36.7 °C) 80 15 106/69 100 % Oxygen Therapy O2 Sat (%): 98 % (05/23/21 0722) Pulse via Oximetry: 106 beats per minute (05/19/21 1147) O2 Device: None (Room air) (05/22/21 1930) O2 Flow Rate (L/min): 4 l/min (05/20/21 0032) Body mass index is 25.4 kg/m². Physical Exam:  
General: chronically Ill appearing; No acute distress, speaking in full sentences, no use of accessory muscles HEENT: Pupils equal; oropharynx is clear Neck: Supple, no lymphadenopathy, no JVD Lungs: Clear to auscultation bilaterally resp even and nonlabored Cardiovascular: Regular rate and rhythm with normal S1 and S2 no murmurs rubs gallops Abdomen: Soft, nontender, + distended, normoactive bowel sounds Extremities: No cyanosis clubbing or edema Neuro: Nonfocal, A&O x3  
Psych: Normal affect Data Review: 
I have reviewed all labs, meds, and studies from the last 24 hours: 
 
Labs: 
 
Recent Results (from the past 24 hour(s)) PREALBUMIN Collection Time: 05/23/21  5:21 AM  
Result Value Ref Range Prealbumin 6.86 (L) 18.0 - 21.4 MG/DL  
METABOLIC PANEL, COMPREHENSIVE  Collection Time: 05/23/21  5:21 AM  
Result Value Ref Range Sodium 130 (L) 136 - 145 mmol/L Potassium 4.6 3.5 - 5.1 mmol/L Chloride 99 98 - 107 mmol/L  
 CO2 24 21 - 32 mmol/L Anion gap 7 7 - 16 mmol/L Glucose 175 (H) 65 - 100 mg/dL BUN 42 (H) 8 - 23 MG/DL Creatinine 1.33 0.8 - 1.5 MG/DL  
 GFR est AA >60 >60 ml/min/1.73m2 GFR est non-AA 56 (L) >60 ml/min/1.73m2 Calcium 8.0 (L) 8.3 - 10.4 MG/DL Bilirubin, total 1.3 (H) 0.2 - 1.1 MG/DL  
 ALT (SGPT) 26 12 - 65 U/L  
 AST (SGOT) 34 15 - 37 U/L Alk. phosphatase 108 50 - 136 U/L Protein, total 5.0 (L) 6.3 - 8.2 g/dL Albumin 2.2 (L) 3.2 - 4.6 g/dL Globulin 2.8 2.3 - 3.5 g/dL A-G Ratio 0.8 (L) 1.2 - 3.5    
CBC W/O DIFF Collection Time: 05/23/21  5:21 AM  
Result Value Ref Range WBC 7.6 4.3 - 11.1 K/uL  
 RBC 2.85 (L) 4.23 - 5.6 M/uL HGB 9.6 (L) 13.6 - 17.2 g/dL HCT 28.1 (L) 41.1 - 50.3 % MCV 98.6 (H) 79.6 - 97.8 FL  
 MCH 33.7 (H) 26.1 - 32.9 PG  
 MCHC 34.2 31.4 - 35.0 g/dL  
 RDW 14.7 (H) 11.9 - 14.6 % PLATELET 60 (L) 517 - 450 K/uL MPV 11.9 9.4 - 12.3 FL ABSOLUTE NRBC 0.00 0.0 - 0.2 K/uL All Micro Results None EKG Results Procedure 720 Value Units Date/Time EKG, 12 LEAD, SUBSEQUENT [[id number]] Collected: 05/18/21 1224 Order Status: Completed Updated: 05/18/21 1549 Ventricular Rate 111 BPM   
  Atrial Rate 111 BPM   
  P-R Interval 160 ms QRS Duration 76 ms   
  Q-T Interval 320 ms QTC Calculation (Bezet) 435 ms Calculated P Axis 68 degrees Calculated R Axis 12 degrees Calculated T Axis 68 degrees Diagnosis --  
  !! AGE AND GENDER SPECIFIC ECG ANALYSIS !! Sinus tachycardia Otherwise normal ECG When compared with ECG of 18-MAY-2021 10:40, No significant change was found Confirmed by [facility]  MD (UC), Malu Solomon ([id number]) on 5/18/2021 3:48:40 PM 
  
 EKG 12 LEAD INITIAL [[id number]] Collected: 05/18/21 1040  Order Status: Completed Updated: 05/18/21 1544  
  Ventricular Rate 115 BPM   
  Atrial Rate 115 BPM   
  P-R Interval 156 ms QRS Duration 70 ms Q-T Interval 302 ms QTC Calculation (Bezet) 417 ms Calculated P Axis 61 degrees Calculated R Axis 2 degrees Calculated T Axis 79 degrees Diagnosis --  
  !! AGE AND GENDER SPECIFIC ECG ANALYSIS !! Sinus tachycardia 
possible  Inferior infarct , age undetermined 
low voltage Abnormal ECG No previous ECGs available Confirmed by Ed Dodd MD (), Valentino Khan ([id number]) on 5/18/2021 3:43:43 PM 
  
  
 
 
Other Studies: No results found. Current Meds:  
Current Facility-Administered Medications Medication Dose Route Frequency  pantoprazole (PROTONIX) tablet 40 mg  40 mg Oral ACB  propranoloL (INDERAL) tablet 20 mg  20 mg Oral TID  traMADoL (ULTRAM) tablet 50 mg  50 mg Oral Q6H PRN  
 furosemide (LASIX) tablet 40 mg  40 mg Oral DAILY  spironolactone (ALDACTONE) tablet 25 mg  25 mg Oral DAILY  sodium chloride (NS) flush 5-40 mL  5-40 mL IntraVENous Q8H  
 sodium chloride (NS) flush 5-40 mL  5-40 mL IntraVENous PRN  
 ondansetron (ZOFRAN) injection 4 mg  4 mg IntraVENous Q4H PRN  
 lactulose (CHRONULAC) 10 gram/15 mL solution 30 mL  20 g Oral TID Problem List: 
Hospital Problems as of 5/23/2021 Date Reviewed: 4/22/2021 Codes Class Noted - Resolved POA Hyperkalemia ICD-10-CM: E87.5 ICD-9-CM: 276.7  5/18/2021 - Present Unknown GIB (gastrointestinal bleeding) ICD-10-CM: K92.2 ICD-9-CM: 578.9  5/18/2021 - Present Unknown Acute hepatic encephalopathy ICD-10-CM: K72.00 ICD-9-CM: 572.2  5/18/2021 - Present Unknown Notes, labs, VS, diagnostic testing reviewed Case discussed with pt, care team, Dr. Destiny Santoyo Signed By: Carlos Pollack  [facility] Service  May 23, 2021  5:15 PM

## 2021-05-24 NOTE — PROGRESS NOTES
Hospitalist Progress Note Subjective:  
Daily Progress Note: 5/24/2021 8:32 AM 
 
Patient with end stage cirrhosis of the liver with ascites and prior paracentesis presented to ER 5/18 for bright red rectal bleeding, abdominal distention without pain, generalized fatigue and malaise, nausea without vomiting. Also has superficial bleeding over umbilical hernia. Known liver disease with ascites. Recent paracentesis for 11 liters. Mental status altered on arrival.  No additional symptoms. Ammonia:    
Heme positive stool. Tachycardic on arrival. Ammonia: 47.  Lactic acid: 2.6.  
5/19:  Lactic acid down to 1.6. EGD as below. No sign of GI bleeding. GI states life expectancy is less than six months. Declining rapidly, they feel hospice appropriate. To remain on lactulose indefinitely, titrate to 2-3 BMs daily. Propranolol for primary bleeding prophylaxis. OP follow up with GI  
5/20:  Palliative in. 
5/23:  TSH 10.5, Ammonia: 87. Somnolent in pm, delayed responses. 1 BM in past two days. Labored breathing, MD in for eval.  Lactulose increased to 30 GM tid, able to pass bedside swallow 5/24:  Hospice returned to discuss hospice house with patient, wife and daughter. Wife with recent hip replacement, unable to care for patient at home. Patient does not meet hospice house care guidelines as he is still able to tolerate po intermittently at this time. Patient slept most of day. Intermittently disoriented and confused, more so than not. Abdomen remains large, distended, however is soft, fluctuant. Wife and daughter at bedside. Patient appears to recognize them. Assessment/Plan:  
ACUTE HEPATIC ENCEPHALOPATHY:   
 Continue lactulose 45 ml qid as tolerated. Wean to 2-3 BMs daily. 5/24:  Not as confused today 5/24:  Ammonia: 87 last pm, repeat ammonia and lactic acid in am   
 
HYPERKALEMIA:   
 5/24:  K+ 4.9 GI BLEED:  Minor, continue PPI  
 
CAROTID BODY TUMOR:  Noted CKD III: :  Creatinine worsening, 1.76 today NIDDM II WITH AIC: A1C: 5.5. HTN:  Off Norvasc due to persistent hypotension. HISTORY OF ESOPHAGEAL VARICIES WITH BANDING:  No acute bleeding SEVERE NAFLD CIRRHOSIS OF THE LIVER WITH ASCITES:  END STAGE WITHOUT TRANSPLANT:   
 Followed by Dr Odilon Lopez, GI on board Last paracentesis:  with 10,900 ml removed :  WBC up to 26.5 today : Total Bili up to 1.9.   
 
TSH:  10.5:  Free T4 ordered. Treat appropriately. ELEVATED TSH:  10.5 Current Facility-Administered Medications Medication Dose Route Frequency  lactulose (CHRONULAC) 10 gram/15 mL solution 45 mL  30 g Oral QID  pantoprazole (PROTONIX) tablet 40 mg  40 mg Oral ACB  propranoloL (INDERAL) tablet 20 mg  20 mg Oral TID  [Held by provider] traMADoL (ULTRAM) tablet 50 mg  50 mg Oral Q6H PRN  
 furosemide (LASIX) tablet 40 mg  40 mg Oral DAILY  spironolactone (ALDACTONE) tablet 25 mg  25 mg Oral DAILY  sodium chloride (NS) flush 5-40 mL  5-40 mL IntraVENous Q8H  
 sodium chloride (NS) flush 5-40 mL  5-40 mL IntraVENous PRN  
 ondansetron (ZOFRAN) injection 4 mg  4 mg IntraVENous Q4H PRN Review of Systems A comprehensive review of systems was negative except for that written in the HPI. Objective:  
 
Visit Vitals BP (!) 84/45 Pulse 89 Temp 97.2 °F (36.2 °C) Resp 18 Ht 5' 10\" (1.778 m) Wt 80.3 kg (177 lb) SpO2 100% BMI 25.40 kg/m² O2 Flow Rate (L/min): 4 l/min O2 Device: None (Room air) Temp (24hrs), Av.1 °F (36.7 °C), Min:97.2 °F (36.2 °C), Max:98.5 °F (36.9 °C) 
 
 1901 -  0700 In: 600 [P.O.:600] Out: 1775 [DREJ:[id number]] General appearance: Somnolent, rouses to name only, then back to sleep. Wife and daughter at bedside. Have decided on Hospice House. Intermittently confused. Cooperative, does not seem to be in pain, not restless. Head: Normocephalic, without obvious abnormality, atraumatic Eyes: conjunctivae/corneas icteric. PERRL Throat: Lips, mucosa, and tongue dry. Teeth and gums normal 
Neck: supple, symmetrical, trachea midline, and no JVD Lungs: Diminished in bases, otherwise clear to auscultation bilaterally Heart: regular rate and rhythm, S1, S2 normal, no murmur, click, rub or gallop Abdomen: Distended, ascites, soft, non-tender. Bowel sounds normal. No masses,  no organomegaly Extremities: All extremities with muscle wasting, otherwise normal, atraumatic, no cyanosis or edema Skin: Skin color jaundiced, texture, turgor dry, otherwise normal. No rashes or lesions Neurologic: Grossly normal 
 
Additional comments: Notes,orders, test results, vitals reviewed Data Review Recent Results (from the past 24 hour(s)) GLUCOSE, POC Collection Time: 05/23/21  7:10 PM  
Result Value Ref Range Glucose (POC) 146 (H) 65 - 100 mg/dL Performed by David Sterling AMMONIA Collection Time: 05/23/21  7:55 PM  
Result Value Ref Range Ammonia 87 (H) 11 - 32 UMOL/L  
TSH 3RD GENERATION Collection Time: 05/23/21 10:06 PM  
Result Value Ref Range TSH 10.500 (H) 0.358 - 3.740 uIU/mL  
POC VENOUS BLOOD GAS Collection Time: 05/23/21 10:13 PM  
Result Value Ref Range Device: ROOM AIR    
 FIO2 (POC) 21 %  
 pH, venous (POC) 7.57 (H) 7.32 - 7.42    
 pCO2, venous (POC) 23.1 (L) 41 - 51 MMHG  
 pO2, venous (POC) 73 mmHg HCO3, venous (POC) 21.0 (L) 23 - 28 MMOL/L  
 sO2, venous (POC) 96.8 (H) 65 - 88 % Base excess, venous (POC) 0.0 mmol/L Allens test (POC) NOT APPLICABLE Total resp. rate 20 Specimen type (POC) VENOUS BLOOD Performed by Hortensia Critical value read back DRPHAM   
CBC WITH AUTOMATED DIFF Collection Time: 05/24/21  5:29 AM  
Result Value Ref Range WBC 26.5 (H) 4.3 - 11.1 K/uL  
 RBC 3.12 (L) 4.23 - 5.6 M/uL  
 HGB 10.7 (L) 13.6 - 17.2 g/dL HCT 30.7 (L) 41.1 - 50.3 %  MCV 98.4 (H) 79.6 - 97.8 FL  
 MCH 34.3 (H) 26.1 - 32.9 PG  
 MCHC 34.9 31.4 - 35.0 g/dL  
 RDW 15.2 (H) 11.9 - 14.6 % PLATELET 52 (L) 231 - 450 K/uL MPV 12.4 (H) 9.4 - 12.3 FL ABSOLUTE NRBC 0.00 0.0 - 0.2 K/uL  
 DF AUTOMATED NEUTROPHILS 91 (H) 43 - 78 % LYMPHOCYTES 1 (L) 13 - 44 % MONOCYTES 6 4.0 - 12.0 % EOSINOPHILS 0 (L) 0.5 - 7.8 % BASOPHILS 0 0.0 - 2.0 % IMMATURE GRANULOCYTES 1 0.0 - 5.0 %  
 ABS. NEUTROPHILS 24.2 (H) 1.7 - 8.2 K/UL  
 ABS. LYMPHOCYTES 0.3 (L) 0.5 - 4.6 K/UL  
 ABS. MONOCYTES 1.6 (H) 0.1 - 1.3 K/UL  
 ABS. EOSINOPHILS 0.0 0.0 - 0.8 K/UL  
 ABS. BASOPHILS 0.1 0.0 - 0.2 K/UL  
 ABS. IMM. GRANS. 0.3 0.0 - 0.5 K/UL METABOLIC PANEL, COMPREHENSIVE Collection Time: 05/24/21  5:29 AM  
Result Value Ref Range Sodium 131 (L) 136 - 145 mmol/L Potassium 4.9 3.5 - 5.1 mmol/L Chloride 99 98 - 107 mmol/L  
 CO2 22 21 - 32 mmol/L Anion gap 10 7 - 16 mmol/L Glucose 125 (H) 65 - 100 mg/dL BUN 44 (H) 8 - 23 MG/DL Creatinine 1.76 (H) 0.8 - 1.5 MG/DL  
 GFR est AA 49 (L) >60 ml/min/1.73m2 GFR est non-AA 41 (L) >60 ml/min/1.73m2 Calcium 8.3 8.3 - 10.4 MG/DL Bilirubin, total 1.9 (H) 0.2 - 1.1 MG/DL  
 ALT (SGPT) 33 12 - 65 U/L  
 AST (SGOT) 48 (H) 15 - 37 U/L Alk. phosphatase 139 (H) 50 - 136 U/L Protein, total 5.2 (L) 6.3 - 8.2 g/dL Albumin 2.2 (L) 3.2 - 4.6 g/dL Globulin 3.0 2.3 - 3.5 g/dL A-G Ratio 0.7 (L) 1.2 - 3.5    
 
5/18:  CXR: Pulmonary venous congestion with probable mild interstitial pulmonary edema. Atherosclerosis 5/18:  ABDOMINAL ULTRASOUND: Morphologic changes of hepatic cirrhosis with large volume of abdominopelvic ascites. The portal veins remain patent with appropriate direction of flow. Cholelithiasis without acute cholecystitis. 5/23:  CT HEAD:  No acute process. 5/23:  EGD:  Esophagus: Grade 2 varices without bleeding stigmata. Stomach: Moderate PHG Duodenum: Normal 
Estimated blood loss:  0 cc-minimal 
IMPRESSION: No bleeding or source for nausea.   He denies nausea today. He has moderate varices and needs primary bleeding prophylaxis. PLAN: Start propranolol, advance diet 
  
Care Plan discussed with: Patient, CM, Wife, daughter and Nurse Signed By: Rmaan Boyer NP May 24, 2021

## 2021-05-24 NOTE — PROGRESS NOTES
Night Team Progress Note Notified by RN that pt was noted to have become more somnolent, confused, slow to respond at shift change. Pt has a history of cirrhosis and is on Lactulose but only 1 BM today and none yesterday. He is being admitted for GIB, acute hepatic encephalopathy s/p EGD on 5/19 that shows G2 esophageal varices without bleeding. Per note this AM, pt was not feeling well and there was a discussion about hospice but pt wanted to discuss with his wife first prior to proceed. At bedside, pt was somnolent, alert to name calling, knew name and place but not consistent with following commands or answering questions. Moving all extremities spontaneously. Answered yes/no to some questions appropriately. Heart RRR, Lungs CTA, abdomen soft, distended, +BS. Denies SOB, chest pain, abdominal pain, headache. Vitals with , /64, T 98.4 O2sat 96% on RA Ammonia level 87 Suspect worsening hepatic encephalopathy. Increase Lactulose to 30g TID, pt able to pass bedside swallow eval. Pt also received Tramadol at 7PM so tried 1x Narcan but unimproved. CT head, UA, VBG, TSH, remote telemetry. Avoid sedating meds. Fall precautions. Delirium precautions Cont to monitor pending results. This visit took 35 minutes of Critical Care time in evaluation and management of a critically ill or injured patient, such that the critical illness or injury acutely impairs one or more organ system: AMS, such that there is a high probability of imminent or life-threatening deterioration in the patient's condition needing immediate attention or presence of critical care physician near bedside for the above time. The time listed is exclusive of any procedures which may have been performed.   Critical care time includes time spent at bedside performing patient interview and physical exam, time spent researching patient prior to interaction with patient, time spent discussing findings and treatment plan with patient and/or family, time spent discussing patient with consultants and colleagues, time spent reviewing pertinent laboratory and radiographic evaluations, time spent re-evaluating patient, and/or time spent discussing patient with nursing staff.  
 
 
Roopa Bradshaw, DO

## 2021-05-24 NOTE — PROGRESS NOTES
Physical Therapy Note: 
 
Pt is currently meeting with physician. PT will attempt at a later time as schedule allows and pt is able. Thank you, Carlitos Chinchilla, PT, DPT

## 2021-05-24 NOTE — PROGRESS NOTES
OT treatment attempted; pt w/ another provider. Will f/u as schedule and pt's status allows.  
 
Joseline Dickson, OT

## 2021-05-24 NOTE — ACP (ADVANCE CARE PLANNING)
Advance Care Planning Advance Care Planning Inpatient Note [facility] Today's Date: 5/24/2021 Unit: [facility] 2 SURGICAL Received request from patient. Upon review of chart and communication with care team, patient's decision making abilities are not in question. Patient, Spouse and Child/children was/were present in the room during visit. Goals of ACP Conversation: 
Discuss Advance Care planning documents Health Care Decision Makers:   
 
Click here to complete [address] including selection of the Healthcare Decision Maker Relationship (ie \"Primary\") Today we: 
Updated Healthcare Decision Maker Advance Care Planning Documents (Patient Wishes) on file: 
Healthcare Power of /Advance Directive appointment of Health care agent Assessment:   
**staff had been in room to verify alertness of patient. Reviewed document with patient and his family present. Completed as desired. Copies provided. * Interventions: 
Assisted in the completion of documents according to patient's wishes at this time Outcomes/Plan: 
New Advance Directive completed Electronically signed by Chaplain Manjit on 5/24/2021 at 10:04 AM

## 2021-05-24 NOTE — HOSPICE
Hospice conversation at the bedside with wife and daughter. Hospice Philosophy, levels of care, and criteria for admission discussed. Family is interested in [facility]. Mrs. Maame Sadler had a Hip replacement about 6 weeks ago and is still undergoing therapy. Reviewed patients chart with Dr. Abran Pressley. Domiciliary care offered to family for today. Patient does not meet GIP level of care yet. I reached out to Mrs. Maame Sadler and she said she would prefer to keep Mr. Maame Sadler in the hospital and reevaluate patient tomorrow. Any questions, please call. Juliann Jacques RN BSN [facility] [phone]

## 2021-05-24 NOTE — PROGRESS NOTES
CM sent referral to [facility], patient family interested in hospice house. CM contacted the intake office.

## 2021-05-25 NOTE — PROGRESS NOTES
Patient more alert this shift, voided, went to restroom with assistance, patient resting in bed, eyes closed.

## 2021-05-25 NOTE — PROGRESS NOTES
Hospitalist Progress Note Subjective:  
Daily Progress Note: 5/25/2021 10:05 AM 
 
  
Patient with end stage cirrhosis of the liver with ascites and prior paracentesis presented to ER 5/18 for bright red rectal bleeding, abdominal distention without pain, generalized fatigue and malaise, nausea without vomiting. Also has superficial bleeding over umbilical hernia. Known liver disease with ascites. Recent paracentesis for 11 liters. Mental status altered on arrival.  No additional symptoms. Ammonia:    
Heme positive stool. Tachycardic on arrival. Ammonia: 47.  Lactic acid: 2.6.  
5/19:  Lactic acid down to 1.6. EGD as below. No sign of GI bleeding. GI states life expectancy is less than six months. Declining rapidly, they feel hospice appropriate. To remain on lactulose indefinitely, titrate to 2-3 BMs daily. Propranolol for primary bleeding prophylaxis. OP follow up with GI  
5/20:  Palliative in. 
5/23:  TSH 10.5, Ammonia: 87. Somnolent in pm, delayed responses. 1 BM in past two days. Labored breathing, MD in for eval.  Lactulose increased to 30 GM tid, able to pass bedside swallow  
 5/24:  Hospice returned to discuss hospice house with patient, wife and daughter. Wife with recent hip replacement, unable to care for patient at home. Patient does not meet hospice house care guidelines as he is still able to tolerate po intermittently at this time. Patient slept most of day. Intermittently disoriented and confused, more so than not. Abdomen remains large, distended, however is soft, fluctuant. Wife and daughter at bedside. Patient appears to recognize them.   
  
5/25:  Daughter and wife at bedside. Patient a little more alert today, able to get up to bathroom with assist.  Family attempting to make discharge decision as not appropriate for hospice house at this time as he does not need aggressive care as yet. Hospice RN, Veronica Montoya in to speak with family.  They do not want comfort care as yet.  
 
Assessment/Plan:  
ACUTE HEPATIC ENCEPHALOPATHY:   
            Continue lactulose 45 ml qid as tolerated. Wean to 2-3 BMs daily. 5/24:  Not as confused today 5/24:  Ammonia: 87 last pm, repeat ammonia and lactic acid in am   
          5/25:  Labs ordered for tomorrow  
  
HYPERKALEMIA:   
            5/24:  K+ 4.9  
  
GI BLEED:  Minor, continue PPI  
  
CAROTID BODY TUMOR:  Noted  
  
CKD III:   
            5/24:  Creatinine worsening, 1.76 today  
  
NIDDM II WITH AIC: A1C: 5.5.   
  
HTN:  Off Norvasc due to persistent hypotension.   
  
HISTORY OF ESOPHAGEAL VARICIES WITH BANDING:  No acute bleeding  
  
SEVERE NAFLD CIRRHOSIS OF THE LIVER WITH ASCITES:  END STAGE WITHOUT TRANSPLANT:   
            Followed by Dr Mil Callahan, GI on board Last paracentesis: 5/4/21 with 10,900 ml removed 5/24:  WBC up to 26.5 today 5/24: Total Bili up to 1.9.   
  
TSH:  10.5:  Free T4 normal 
 
Difficult discharge placement, family can not care for him at home. Does not qualify for [facility] as yet. Current Facility-Administered Medications Medication Dose Route Frequency  morphine injection 2 mg  2 mg IntraVENous Q2H PRN  
 lactulose (CHRONULAC) 10 gram/15 mL solution 45 mL  30 g Oral QID  pantoprazole (PROTONIX) tablet 40 mg  40 mg Oral ACB  propranoloL (INDERAL) tablet 20 mg  20 mg Oral TID  [Held by provider] traMADoL (ULTRAM) tablet 50 mg  50 mg Oral Q6H PRN  
 furosemide (LASIX) tablet 40 mg  40 mg Oral DAILY  spironolactone (ALDACTONE) tablet 25 mg  25 mg Oral DAILY  sodium chloride (NS) flush 5-40 mL  5-40 mL IntraVENous Q8H  
 sodium chloride (NS) flush 5-40 mL  5-40 mL IntraVENous PRN  
 ondansetron (ZOFRAN) injection 4 mg  4 mg IntraVENous Q4H PRN Review of Systems Patient is unable Objective:  
 
Visit Vitals /63 Pulse 87 Temp 97.6 °F (36.4 °C) Resp 18 Ht 5' 10\" (1.778 m) Wt 75.3 kg (166 lb 1.6 oz) SpO2 99% BMI 23.83 kg/m² O2 Flow Rate (L/min): 4 l/min O2 Device: None (Room air) Temp (24hrs), Av.9 °F (36.6 °C), Min:97.6 °F (36.4 °C), Max:98.2 °F (36.8 °C) 
 
 190 -  0700 In: 600 [P.O.:600] Out: 550 [Urine:550] General appearance: More alert today, intermittently awake, otherwise dozing most of time. Up to bathroom with assist.  Wife and daughter at bedside. Cooperative, does not seem to be in pain, not restless. Head: Normocephalic, without obvious abnormality, atraumatic Eyes: conjunctivae/corneas icteric. PERRL Throat: Lips, mucosa, and tongue dry. Teeth and gums normal 
Neck: supple, symmetrical, trachea midline, and no JVD Lungs: Diminished in bases, otherwise clear to auscultation bilaterally Heart: regular rate and rhythm, S1, S2 normal, no murmur, click, rub or gallop Abdomen: Distended, ascites, soft, non-tender. Bowel sounds normal. No masses,  no organomegaly Extremities: All extremities with muscle wasting, otherwise normal, atraumatic, no cyanosis or edema Skin: Skin color jaundiced, texture, turgor dry, otherwise normal. No rashes or lesions Neurologic: Grossly normal 
  
Additional comments: Notes,orders, test results, vitals reviewed Data Review Recent Results (from the past 24 hour(s)) AMMONIA Collection Time: 21  4:22 AM  
Result Value Ref Range Ammonia 61 (H) 11 - 32 UMOL/L  
LACTIC ACID Collection Time: 21  4:22 AM  
Result Value Ref Range Lactic acid 2.2 (H) 0.4 - 2.0 MMOL/L  
CBC WITH AUTOMATED DIFF Collection Time: 21  4:22 AM  
Result Value Ref Range WBC 16.6 (H) 4.3 - 11.1 K/uL  
 RBC 2.90 (L) 4.23 - 5.6 M/uL HGB 9.8 (L) 13.6 - 17.2 g/dL HCT 28.2 (L) 41.1 - 50.3 % MCV 97.2 79.6 - 97.8 FL  
 MCH 33.8 (H) 26.1 - 32.9 PG  
 MCHC 34.8 31.4 - 35.0 g/dL  
 RDW 15.1 (H) 11.9 - 14.6 % PLATELET 59 (L) 846 - 450 K/uL MPV 12.2 9.4 - 12.3 FL ABSOLUTE NRBC 0.00 0.0 - 0.2 K/uL DF AUTOMATED NEUTROPHILS 78 43 - 78 % LYMPHOCYTES 7 (L) 13 - 44 % MONOCYTES 14 (H) 4.0 - 12.0 % EOSINOPHILS 1 0.5 - 7.8 % BASOPHILS 0 0.0 - 2.0 % IMMATURE GRANULOCYTES 1 0.0 - 5.0 %  
 ABS. NEUTROPHILS 13.0 (H) 1.7 - 8.2 K/UL  
 ABS. LYMPHOCYTES 1.1 0.5 - 4.6 K/UL  
 ABS. MONOCYTES 2.3 (H) 0.1 - 1.3 K/UL  
 ABS. EOSINOPHILS 0.1 0.0 - 0.8 K/UL  
 ABS. BASOPHILS 0.0 0.0 - 0.2 K/UL  
 ABS. IMM. GRANS. 0.1 0.0 - 0.5 K/UL METABOLIC PANEL, COMPREHENSIVE Collection Time: 05/25/21  4:22 AM  
Result Value Ref Range Sodium 128 (L) 138 - 145 mmol/L Potassium 4.2 3.5 - 5.1 mmol/L Chloride 96 (L) 98 - 107 mmol/L  
 CO2 25 21 - 32 mmol/L Anion gap 7 7 - 16 mmol/L Glucose 132 (H) 65 - 100 mg/dL BUN 45 (H) 8 - 23 MG/DL Creatinine 1.59 (H) 0.8 - 1.5 MG/DL  
 GFR est AA 55 (L) >60 ml/min/1.73m2 GFR est non-AA 46 (L) >60 ml/min/1.73m2 Calcium 8.2 (L) 8.3 - 10.4 MG/DL Bilirubin, total 1.3 (H) 0.2 - 1.1 MG/DL  
 ALT (SGPT) 32 12 - 65 U/L  
 AST (SGOT) 33 15 - 37 U/L Alk. phosphatase 124 50 - 136 U/L Protein, total 5.1 (L) 6.3 - 8.2 g/dL Albumin 2.2 (L) 3.2 - 4.6 g/dL Globulin 2.9 2.3 - 3.5 g/dL A-G Ratio 0.8 (L) 1.2 - 3.5    
 
 5/18:  CXR: Pulmonary venous congestion with probable mild interstitial pulmonary edema. Atherosclerosis 
  
5/18:  ABDOMINAL ULTRASOUND: Morphologic changes of hepatic cirrhosis with large volume of abdominopelvic ascites. The portal veins remain patent with appropriate direction of flow. Cholelithiasis without acute cholecystitis.  
  
5/23:  CT HEAD:  No acute process. 
  
5/23:  EGD:  Esophagus: Grade 2 varices without bleeding stigmata. Stomach: Moderate PHG Duodenum: Normal 
Estimated blood loss:  0 cc-minimal 
IMPRESSION: No bleeding or source for nausea.  He denies nausea today.  He has moderate varices and needs primary bleeding prophylaxis.  
PLAN: Start propranolol, advance diet 
  
Care Plan discussed with: Patient, wife, CM, daughter, Hospice RN, and Nurse Signed By: Emilia Davidson NP May 25, 2021

## 2021-05-25 NOTE — HOSPICE
[facility] Follow up visit with patient (currently napping) and daughter Liyah Falcon at bedside. Patient remains eligible for routine level of care with hospice, not for GIP as he lacks the need for aggressive symptom management. Family is unable to accept routine bed at [facility] which would entail paying for room and board (not covered by Medicare). They also report that home hospice is not an option due to lack of caregiver. Discussed with Elva Denton NP, family will discuss whether they want to implement comfort care only, including discontinuing lactulose. [facility] remains available for any questions or re-evaluation. Thank you for this referral, Jane Cordero, RN, BSN 
[facility] liaison [phone]

## 2021-05-26 PROBLEM — E44.0 MODERATE PROTEIN-CALORIE MALNUTRITION (HCC): Status: ACTIVE | Noted: 2021-01-01

## 2021-05-26 NOTE — HOSPICE
[facility] 1200 Follow up on hospice referral, patient resting quietly with no family present. RONALD Martin working to reach family to discuss discharge options. Patient is still not requiring symptom management to qualify for GIP at [facility]. Nehal Sullivan has offered a routine bed at [facility] but family has not agreed to comfort care yet and unable to pay room and board at [facility] for an indefinite period of time. Liaison remains available if needed. [address] Per hospital RONALD Martin, family at bedside has questions about home hospice. Araceli spoke with patient's daughter Liyah Falcon [phone], explained home hospice benefit and answered her questions. She has my cell # if needed, states they plan to look at the list of facilities and follow up with CM tomorrow. Thank you for this referral, Jane Cordero, RN, BSN 
Nehal valerio [phone]

## 2021-05-26 NOTE — PROGRESS NOTES
Problem: Pressure Injury - Risk of 
Goal: *Prevention of pressure injury Description: Document Rio Scale and appropriate interventions in the flowsheet. Outcome: Progressing Towards Goal 
Note: Pressure Injury Interventions: 
Sensory Interventions: Assess changes in LOC, Check visual cues for pain Moisture Interventions: Absorbent underpads, Check for incontinence Q2 hours and as needed Activity Interventions: Increase time out of bed, Pressure redistribution bed/mattress(bed type) Mobility Interventions: Pressure redistribution bed/mattress (bed type) Nutrition Interventions: Document food/fluid/supplement intake, Offer support with meals,snacks and hydration Friction and Shear Interventions: Apply protective barrier, creams and emollients, HOB 30 degrees or less, Foam dressings/transparent film/skin sealants, Minimize layers Problem: Patient Education: Go to Patient Education Activity Goal: Patient/Family Education Outcome: Progressing Towards Goal 
  
Problem: Falls - Risk of 
Goal: *Absence of Falls Description: Document Anita Vela Fall Risk and appropriate interventions in the flowsheet. Outcome: Progressing Towards Goal 
Note: Fall Risk Interventions: 
Mobility Interventions: Communicate number of staff needed for ambulation/transfer, Patient to call before getting OOB Mentation Interventions: Adequate sleep, hydration, pain control, Door open when patient unattended, More frequent rounding, Toileting rounds Medication Interventions: Patient to call before getting OOB, Teach patient to arise slowly Elimination Interventions: Call light in reach, Patient to call for help with toileting needs, Urinal in reach History of Falls Interventions: Evaluate medications/consider consulting pharmacy, Investigate reason for fall Problem: Patient Education: Go to Patient Education Activity Goal: Patient/Family Education Outcome: Progressing Towards Goal 
  
Problem: Patient Education: Go to Patient Education Activity Goal: Patient/Family Education Outcome: Progressing Towards Goal 
  
Problem: Patient Education: Go to Patient Education Activity Goal: Patient/Family Education Outcome: Progressing Towards Goal 
  
Problem: Nutrition Deficit Goal: *Optimize nutritional status Outcome: Progressing Towards Goal

## 2021-05-26 NOTE — PROGRESS NOTES
CM spoke with patient family regarding their choice/decision for discharge. Patient wife stated she had hip surgery but is unable to care for patient. CM explained we could set [place] up for patient as he does not meet criteria for GIP at the [facility]. Patient wife Jacqueline Calderon stated she cannot take care of him alone, CM explained they would have a nurse come out but it would be an oop expense to hire someone else to come into the home to help. Jacqueline Calderon stated they cannot afford the expense and that Vincent Pastor NP told them she would be back on Friday and they could just talk with her then.

## 2021-05-26 NOTE — PROGRESS NOTES
[facility]ist Progress Note Name:  Yan Slaughter  Age:75 y.o. Sex:male :  1945 MRN:  [id number] Admit Date:  2021 Reason for Admission: 
GIB (gastrointestinal bleeding) [K92.2] Acute hepatic encephalopathy [K72.00] Hyperkalemia [E87.5] Assessment & Plan Hepatic encephalopathy 
-We will give lactulose 
-Monitor his ammonia levels  
-He is nonfocal. 
-Low threshold for CT head and further indicated. 21 Resolving;  Patient has been declining before hospitalization per family; likely 2/2 elevated ammonia levels. Continue lactulose and monitor 21 Resolved; Continue Lactulose. He will need long term :  Ammonia: 87 last pm, repeat ammonia and lactic acid in am  
:  Labs ordered for tomorrow 21 Ammonia trending upward and patient is refusing lactulose. He can discharge with hospice but unclear if family is willing to take patient home. Attempts to call family unsuccessful by self and CM. Patient is currently alert and oriented. Monitor ammonia level. If patient continues to refuse may need to do rectal lactulose Cirrhosis of the liver/Ascites 
-Continue appropriate medical management 
-GI input appreciated 21 Abd US showing large volume ascites, patent portal veins and cholelithiasis. No suspicious hepatic masses. Patient has declined pursuing liver transplant. Continue Lactulose to a goal of 3 BMs per day 
 Palliative consult to establish [address] and possible hospice consult. Patient does have poor prognosis per GI; appreciate assistance 21 Restart lasix/spironalcatone  in a.m. 
21 Patient is considering hospice. Will continue to treat as above 21 Hospice consulted. Hospice vs . Patient is unsure if he wants any further evaluation at this time Monitor electrolytes, Cr   
 21 See #1; Continue propranolol, lasix, aldactone Grade 2 Varices without bleeding 
-Patient has a positive fecal occult 
-Stool is not grossly black 
-We will monitor H&H and transfuse as appropriate 
-GI consulted 
-Continue IV Protonix 
-EGD per them 
-N.p.o. for now 
-Serial H&H 
-Transfuse for any hemoglobin less than 7 
5/19/21 EGD showing EGD showing esophageal varices G2 without bleeding; Moderate PHG. No source of bleeding found. Hgb holding at 10.1 and will be monitored daily. 2G Na diet/protonix 5/20/21  Palliative has seen patient and he is now considering hospice care. Will discuss with family. Hgb 9.8 and will be monitored 
 5/21/21 Patient desires to be Full Code at this time. Has requested more information concerning hospice which was provided by CM. PT/OT eval pending 5/22/21 Hospice consulted with recs pending; CBC daily, control BP 
  
Hyperkalemia 
-He got calcium gluconate in the ER we will continue him on remote telemetry at this time  
-We will also give dextrose and insulin 
-Continue dextrose in setting of n.p.o. status secondary to GI bleed with plans for possible EGD in the a.m. 
-Repeat potassium levels today 5/19/21 Resolved K+ 5.0 and will be monitored 5/26/21 K+ 4.3  
 
Hyponatremia 5/19/21 Na 127; likely 2/2 volume overload with ascites; Fluid restriction; urine Na and urine osmol pending; IR for paracentesis prn 
5/20/21 Continuing slow correction; now 128; urine Na/Osmol pending 5/21/21 Na chronically low; Will monitor with fluid restrictions 5/26/21 Patient appears to be at his baseline Na 130 Bleeding from umbilicus 
-This was Dermabond in the ER 
-Likely the source of significant bleeding per them 
-We will monitor 5/19/21 Wound care consulted; wound with serosanguinous drainage 5/20/21 Wound team did see patient  with wound care as directed  
5/21/21 Scant drainage noted; Hgb 9.8  
 5/22/21 No drainage on dsg;  Monitor CBC 
  
  
  
  
 
 
 
 
Diet:  DIET CARDIAC 
DIET NUTRITIONAL SUPPLEMENTS 
DVT PPx: SCDs GI Ppx: Protonix Code: Full Code Dispo/Discharge Planning: Dispo pending Hospital Course/Subjective:  
Patient is a 70 y. o. male with medical h/o significant for cirrhosis and varices with a history of paracentesis. He presented to the ER with a complaint of fatigue and some blood in the toilet with a concern for rectal bleeding. Denied any hematemesis or any other associated symptoms.  He was scheduled for a right upper quadrant US and liver biopsy but had to cancel due to his spouse being hospitalized and fear of what would be found in the biopsy.   
  
 
 
Subjective/24 hr Events (05/26/21): Per report family is unable to care for patient at home. Patient is requesting discharge to home. Difficult resolution of conflict due to inability to contact family Review of Systems: 14 point review of systems is otherwise negative with the exception of the elements mentioned above. Objective:  
 
Patient Vitals for the past 24 hrs: 
 Temp Pulse Resp BP SpO2  
05/26/21 1041 98 °F (36.7 °C) 78 17 100/60 97 % 05/26/21 0712 98.2 °F (36.8 °C) 68 17 109/65 98 % 05/26/21 0300 97.8 °F (36.6 °C) 75 17 107/63 98 % 05/25/21 2324 98 °F (36.7 °C) 75 17 113/70 98 % 05/25/21 1912 98.1 °F (36.7 °C) 81 17 113/72 97 % 05/25/21 1505 97.7 °F (36.5 °C) 78 20 120/70 97 % Oxygen Therapy O2 Sat (%): 97 % (05/26/21 1041) Pulse via Oximetry: 106 beats per minute (05/19/21 1147) O2 Device: None (Room air) (05/25/21 1925) O2 Flow Rate (L/min): 4 l/min (05/20/21 0032) Body mass index is 23.83 kg/m². Physical Exam:  
General: No acute distress, speaking in full sentences, no use of accessory muscles HEENT: Pupils equal; oropharynx is clear Neck: no JVD Lungs: Clear to auscultation bilaterally Cardiovascular: Regular rate and rhythm; chronic murmur Abdomen: Soft, nontender, nondistended, normoactive bowel sounds Extremities: No cyanosis clubbing or edema Neuro: Nonfocal, A&O x 2-3 Psych: Normal affect Data Review: 
I have reviewed all labs, meds, and studies from the last 24 hours: Labs: 
 
Recent Results (from the past 24 hour(s)) CBC WITH AUTOMATED DIFF Collection Time: 05/26/21  5:15 AM  
Result Value Ref Range WBC 11.5 (H) 4.3 - 11.1 K/uL  
 RBC 2.73 (L) 4.23 - 5.6 M/uL HGB 9.4 (L) 13.6 - 17.2 g/dL HCT 26.5 (L) 41.1 - 50.3 % MCV 97.1 79.6 - 97.8 FL  
 MCH 34.4 (H) 26.1 - 32.9 PG  
 MCHC 35.5 (H) 31.4 - 35.0 g/dL  
 RDW 15.0 (H) 11.9 - 14.6 % PLATELET 63 (L) 891 - 450 K/uL MPV 11.9 9.4 - 12.3 FL ABSOLUTE NRBC 0.00 0.0 - 0.2 K/uL  
 DF AUTOMATED NEUTROPHILS 76 43 - 78 % LYMPHOCYTES 8 (L) 13 - 44 % MONOCYTES 12 4.0 - 12.0 % EOSINOPHILS 3 0.5 - 7.8 % BASOPHILS 0 0.0 - 2.0 % IMMATURE GRANULOCYTES 1 0.0 - 5.0 %  
 ABS. NEUTROPHILS 8.8 (H) 1.7 - 8.2 K/UL  
 ABS. LYMPHOCYTES 0.9 0.5 - 4.6 K/UL  
 ABS. MONOCYTES 1.4 (H) 0.1 - 1.3 K/UL  
 ABS. EOSINOPHILS 0.3 0.0 - 0.8 K/UL  
 ABS. BASOPHILS 0.0 0.0 - 0.2 K/UL  
 ABS. IMM. GRANS. 0.1 0.0 - 0.5 K/UL METABOLIC PANEL, COMPREHENSIVE Collection Time: 05/26/21  5:15 AM  
Result Value Ref Range Sodium 130 (L) 136 - 145 mmol/L Potassium 4.3 3.5 - 5.1 mmol/L Chloride 97 (L) 98 - 107 mmol/L  
 CO2 27 21 - 32 mmol/L Anion gap 6 (L) 7 - 16 mmol/L Glucose 146 (H) 65 - 100 mg/dL BUN 42 (H) 8 - 23 MG/DL Creatinine 1.38 0.8 - 1.5 MG/DL  
 GFR est AA >60 >60 ml/min/1.73m2 GFR est non-AA 54 (L) >60 ml/min/1.73m2 Calcium 8.1 (L) 8.3 - 10.4 MG/DL Bilirubin, total 1.2 (H) 0.2 - 1.1 MG/DL  
 ALT (SGPT) 27 12 - 65 U/L  
 AST (SGOT) 31 15 - 37 U/L Alk. phosphatase 119 50 - 136 U/L Protein, total 5.0 (L) 6.3 - 8.2 g/dL Albumin 1.9 (L) 3.2 - 4.6 g/dL Globulin 3.1 2.3 - 3.5 g/dL A-G Ratio 0.6 (L) 1.2 - 3.5 MAGNESIUM Collection Time: 05/26/21  5:15 AM  
Result Value Ref Range Magnesium 2.1 1.8 - 2.4 mg/dL AMMONIA Collection Time: 05/26/21  5:15 AM  
Result Value Ref Range Ammonia 77 (H) 11 - 32 UMOL/L All Micro Results None EKG Results Procedure 720 Value Units Date/Time EKG, 12 LEAD, SUBSEQUENT [[id number]] Collected: 05/18/21 1224 Order Status: Completed Updated: 05/18/21 1549 Ventricular Rate 111 BPM   
  Atrial Rate 111 BPM   
  P-R Interval 160 ms QRS Duration 76 ms   
  Q-T Interval 320 ms QTC Calculation (Bezet) 435 ms Calculated P Axis 68 degrees Calculated R Axis 12 degrees Calculated T Axis 68 degrees Diagnosis --  
  !! AGE AND GENDER SPECIFIC ECG ANALYSIS !! Sinus tachycardia Otherwise normal ECG When compared with ECG of 18-MAY-2021 10:40, No significant change was found Confirmed by Laura Sandhu MD (), Anthony Lockett ([id number]) on 5/18/2021 3:48:40 PM 
  
 EKG 12 LEAD INITIAL [[id number]] Collected: 05/18/21 1040 Order Status: Completed Updated: 05/18/21 1544 Ventricular Rate 115 BPM   
  Atrial Rate 115 BPM   
  P-R Interval 156 ms QRS Duration 70 ms Q-T Interval 302 ms QTC Calculation (Bezet) 417 ms Calculated P Axis 61 degrees Calculated R Axis 2 degrees Calculated T Axis 79 degrees Diagnosis --  
  !! AGE AND GENDER SPECIFIC ECG ANALYSIS !! Sinus tachycardia 
possible  Inferior infarct , age undetermined 
low voltage Abnormal ECG No previous ECGs available Confirmed by Laura Sandhu MD (), Anthony Lockett ([id number]) on 5/18/2021 3:43:43 PM 
  
  
 
 
Other Studies: XR CHEST PA LAT Result Date: 5/18/2021 EXAM: XR CHEST PA LAT INDICATION: weakness COMPARISON: None FINDINGS: The cardiomediastinal silhouette is within normal limits. Atherosclerotic calcifications in the aorta. No pleural effusion or pneumothorax. Pulmonary venous congestion with suggestion of increased interstitial opacities with bibasilar predominance. No focal consolidation. Osseous structures are grossly intact with diffuse osteopenia. 1. Pulmonary venous congestion with probable mild interstitial pulmonary edema. 2. Atherosclerosis. US ABD LTD Result Date: 5/18/2021 Exam: Right upper quadrant ultrasound. Indication: Nausea, elevated LFTs, cirrhosis Comparison: Abdominal ultrasound, 8/2/2018 Findings: Pancreas: Not well visualized due to shadowing bowel gas. Liver: The liver demonstrates nodular contour and heterogeneous echotexture throughout. No suspicious hepatic masses. The main portal vein is patent with an appropriate direction of flow and velocity. Gallbladder: Gallbladder wall is normal in thickness. The gallbladder is not distended. There is cholelithiasis. No pericholecystic fluid. Absent sonographic Jordan sign per the sonographer. Biliary: No intra or extrahepatic biliary duct dilatation. Right kidney: The right kidney is normal in echogenicity and normal in size measuring 9.9 cm. No contour deforming mass. No hydronephrosis or perinephric fluid. Abdominal Aorta: Nonaneurysmal in the visualized portion. Inferior Vena Cava: Patent. Ascites: Large volume ascites. 1. Morphologic changes of hepatic cirrhosis with large volume of abdominopelvic ascites. 2. The portal veins remain patent with appropriate direction of flow. 3. Cholelithiasis without acute cholecystitis. Current Meds:  
Current Facility-Administered Medications Medication Dose Route Frequency  morphine injection 2 mg  2 mg IntraVENous Q2H PRN  
 lactulose (CHRONULAC) 10 gram/15 mL solution 45 mL  30 g Oral QID  pantoprazole (PROTONIX) tablet 40 mg  40 mg Oral ACB  propranoloL (INDERAL) tablet 20 mg  20 mg Oral TID  [Held by provider] traMADoL (ULTRAM) tablet 50 mg  50 mg Oral Q6H PRN  
 furosemide (LASIX) tablet 40 mg  40 mg Oral DAILY  spironolactone (ALDACTONE) tablet 25 mg  25 mg Oral DAILY  sodium chloride (NS) flush 5-40 mL  5-40 mL IntraVENous Q8H  
 sodium chloride (NS) flush 5-40 mL  5-40 mL IntraVENous PRN  
 ondansetron (ZOFRAN) injection 4 mg  4 mg IntraVENous Q4H PRN Problem List: 
Hospital Problems as of 5/26/2021 Date Reviewed: 4/22/2021      Codes Class Noted - Resolved POA Moderate protein-calorie malnutrition ([address] ICD-10-CM: E44.0 ICD-9-CM: 263.0  5/26/2021 - Present Yes Hyperkalemia ICD-10-CM: E87.5 ICD-9-CM: 276.7  5/18/2021 - Present Unknown GIB (gastrointestinal bleeding) ICD-10-CM: K92.2 ICD-9-CM: 578.9  5/18/2021 - Present Unknown * (Principal) Acute hepatic encephalopathy ICD-10-CM: K72.00 ICD-9-CM: 572.2  5/18/2021 - Present Unknown Other cirrhosis of liver ([facility] ICD-10-CM: D95.46 ICD-9-CM: 571.5  8/28/2017 - Present Yes Overview Addendum 1/22/2018  6:39 PM by Benjamin Paul MD  
  improving DM2 (diabetes mellitus, type 2) (HCC) ICD-10-CM: E11.9 ICD-9-CM: 250.00  9/19/2012 - Present Yes Overview Addendum 1/22/2018  6:39 PM by Benjamin Paul MD  
   HA1c now at 9.1; pt planning on continuing with diet and exercise. Goal 200 lbs, goal Ha1 <7.0 Eye exam UTD for this year; no change reported. HTN (hypertension) ICD-10-CM: I10 
ICD-9-CM: 401.9  9/19/2012 - Present Yes Overview Addendum 8/28/2017  9:16 AM by Benjamin Paul MD  
  At goal.  Off Norvasc. Pt plans to continue with diet, exercise, and 15 lbs weight loss Part of this note was written by using a voice dictation software and the note has been proof read but may still contain some grammatical/other typographical errors. Signed By: Kt Kan  [facility] Service  May 26, 2021  1:00 PM

## 2021-05-26 NOTE — PROGRESS NOTES
END OF SHIFT NOTE: 
 
INTAKE/OUTPUT 
05/25 0701 - 05/26 0700 In: 480 [P.O.:480] Out: 1100 [Urine:1100] Voiding: YES Catheter: NO 
Drain:   
 
 
 
 
 
Flatus: Patient does have flatus present. Stool:  0 occurrences. Characteristics: 
 
 
Emesis: 0 occurrences. Characteristics: VITAL SIGNS Patient Vitals for the past 12 hrs: 
 Temp Pulse Resp BP SpO2  
05/26/21 0300 97.8 °F (36.6 °C) 75 17 107/63 98 % 05/25/21 2324 98 °F (36.7 °C) 75 17 113/70 98 % 05/25/21 1912 98.1 °F (36.7 °C) 81 17 113/72 97 % Pain Assessment Pain Intensity 1: 0 (05/25/21 1925) Pain Location 1: Back Pain Intervention(s) 1: Medication (see MAR) Patient Stated Pain Goal: 0 Ambulating No 
 
Shift report given to oncoming nurse at the bedside.  
 
Christiana Mao RN

## 2021-05-26 NOTE — PROGRESS NOTES
Chart screened by  for discharge planning. Patient family trying to decide on discharge planning. Patient can discharge home with Hospice family trying to decide. CM will continue to follow patient during hospitalization for discharge planning and needs. Please consult  if any new issues arise. Update: CM left message for patient wife as there is no family at bedside. Patient is again refusing to take the lactulose and has now refused it last night and this morning. CM requested a return call to discuss their choice for discharge.

## 2021-05-26 NOTE — PROGRESS NOTES
Comprehensive Nutrition Assessment Type and Reason for Visit: Initial, RD nutrition re-screen/LOS 
LOS Day 7 Nutrition Recommendations/Plan:  
 Continue with current diet  Add Ensure Enlive with all meals (if all 3 are drank this will account for 711ml of daily fluid) Malnutrition Assessment: 
Malnutrition Status: Moderate malnutrition Context: Chronic illness Findings of clinical characteristics of malnutrition:  
Energy Intake:  Mild decrease in energy intake (specify) Weight Loss:  7.00 - Greater than 10% over 6 months Muscle Mass Loss:  1 - Mild muscle mass loss, Clavicles (pectoralis &deltoids), Temples (temporalis) Nutrition Assessment:  
Nutrition History: Patient weak and able to provide poor nutrition history. Family not present at this time  Cultural/Scientology/Ethnic Food Preference(s): None Nutrition Background: PMH: DM2, HTN, cirrhosis of liver, GIB, acute hepatic encephalopathy. Patient presented with bright red rectal bleeding, AMS and abdominal distention. Patient not eligible for hospice house, family discussing options at this time. Daily Update: 
Spoke with patient who was weak and able to answer very little. He did report he eats some (per RN a few bites at every meal), but drinks more fluid. Patient denies N/V. Patient unable to provide any other information. No family present at this time. Spoke with RN about adding supplement and thought good use of fluid restriction at this time. Nutrition Related Findings:  
Mild wasting Current Nutrition Therapies: DIET CARDIAC Current Intake: Average Meal Intake: 1-25% Average Supplement Intake: None ordered Anthropometric Measures: 
Height: 5' 10\" (177.8 cm) Current Body Wt: 75.3 kg (166 lb 0.1 oz) (5/25), Weight source: Standing scale BMI: 23.8, Normal weight (BMI 22.0-24.9) age over 72 Admission Body Weight: 173 lb 15.1 oz (5/18, unknown source) Ideal Body Weight (lbs) (Calculated): 166 lbs (75 kg), 100 % 
Usual Body Wt: 86.2 kg (190 lb) (per EMR), Percent weight change: -12.6 Edema: No data recorded Estimated Daily Nutrient Needs: 
Energy (kcal/day): 7107-3709 (Kcal/kg (20-25), Weight Used: Current (75.3 kg)) Protein (g/day): 73-88 (1-1.2 g/kg) Weight Used: (Current) Fluid (ml/day):   (1 ml/kcal) Nutrition Diagnosis:  
· Inadequate oral intake related to altered GI function as evidenced by intake 0-25%, weight loss · Moderate malnutrition, In context of chronic illness related to altered GI function, inadequate protein-energy intake as evidenced by poor intake prior to admission, weight loss greater than or equal to 10% in 6 months, moderate muscle loss Nutrition Interventions:  
Food and/or Nutrient Delivery: Continue current diet, Start oral nutrition supplement Coordination of Nutrition Care: Continue to monitor while inpatient Plan of Care discussed with Leilani Due Goals: Active Goal: Intake greater than 50% of nutritional needs within 7 days Nutrition Monitoring and Evaluation:  
  
Food/Nutrient Intake Outcomes: Food and nutrient intake, Supplement intake Physical Signs/Symptoms Outcomes: Biochemical data, GI status Discharge Planning: Too soon to determine Electronically signed by Vicky Miller MS, RD, LD on 5/26/2021 at 11:46 AM. Contact: [phone]

## 2021-05-26 NOTE — PROGRESS NOTES
END OF SHIFT NOTE: 
 
INTAKE/OUTPUT 
05/25 0701 - 05/26 0700 In: 480 [P.O.:480] Out: 1100 [Urine:1100] Voiding: YES Catheter: NO 
Drain:   
 
 
 
 
 
Flatus: Patient does have flatus present. Stool:  0 occurrences. Characteristics: 
 
Emesis: 0 occurrences. Characteristics: VITAL SIGNS Patient Vitals for the past 12 hrs: 
 Temp Pulse Resp BP SpO2  
05/26/21 1548 98.1 °F (36.7 °C) 83 17 116/69 98 % 05/26/21 1041 98 °F (36.7 °C) 78 17 100/60 97 % 05/26/21 0712 98.2 °F (36.8 °C) 68 17 109/65 98 % Pain Assessment Pain Intensity 1: 0 (05/26/21 0830) Pain Location 1: Back Pain Intervention(s) 1: Medication (see MAR) Patient Stated Pain Goal: 0 Ambulating Yes Shift report given to oncoming nurse at the bedside.  
 
Joanne Pruett RN

## 2021-05-27 NOTE — PROGRESS NOTES
END OF SHIFT NOTE: 
 
INTAKE/OUTPUT 
05/26 0701 - 05/27 0700 In: 480 [P.O.:480] Out: 1700 [Urine:1300] Voiding: YES Catheter: NO 
Drain:   
 
 
 
 
 
Flatus: Patient does have flatus present. Stool:  0 occurrences. Characteristics: 
Stool Assessment Stool Color: Alfredia Beth Stool Appearance: Loose Stool Amount: Large Stool Source/Status: Rectum Emesis: 0 occurrences. Characteristics: VITAL SIGNS Patient Vitals for the past 12 hrs: 
 Temp Pulse Resp BP SpO2  
05/27/21 0302 98.2 °F (36.8 °C) 83 17 100/64 97 % 05/26/21 2331 98.3 °F (36.8 °C) 94 17 102/64 98 % 05/26/21 1933 98.1 °F (36.7 °C) 91 18 100/62 98 % Pain Assessment Pain Intensity 1: 0 (05/26/21 1915) Pain Location 1: Back Pain Intervention(s) 1: Medication (see MAR) Patient Stated Pain Goal: 0 Ambulating Yes Shift report given to oncoming nurse at the bedside.  
 
Huan Hdz, RN

## 2021-05-27 NOTE — PROGRESS NOTES
END OF SHIFT NOTE: 
 
INTAKE/OUTPUT 
05/26 0701 - 05/27 0700 In: 480 [P.O.:480] Out: 1700 [Urine:1300] Voiding: YES Catheter: NO 
Drain:   
 
 
 
 
 
Flatus: Patient does have flatus present. Stool:  0 occurrences. Characteristics: 
 
Emesis: 0 occurrences. Characteristics: VITAL SIGNS Patient Vitals for the past 12 hrs: 
 Temp Pulse Resp BP SpO2  
05/27/21 1534 97.6 °F (36.4 °C) 97 18 105/67 96 % 05/27/21 1122 97.4 °F (36.3 °C) 97 18 114/69 98 % Pain Assessment Pain Intensity 1: 0 (05/27/21 0900) Pain Location 1: Back Pain Intervention(s) 1: Medication (see MAR) Patient Stated Pain Goal: 0 Ambulating Yes Shift report given to oncoming nurse at the bedside.  
 
Eren Askew RN

## 2021-05-27 NOTE — PROGRESS NOTES
Palliative Care Progress Note Patient: Leo Camacho MRN: [id number]  SSN: [id number] YOB: 1945  Age: 70 y.o. Sex: male Assessment/Plan: Chief Complaint/Interval History: pt condition continues to decline. Denies current pain, nausea Principal Diagnosis: · Fatigue, Lethargy  R53.83 Additional Diagnoses: · Debility, Unspecified  R53.81 
· Frailty  R54 · Counseling, Encounter for Medical Advice  Z71.9 
· Encounter for Palliative Care  Z51.5 Palliative Performance Scale (PPS) Medical Decision Making:  
Reviewed and summarized labs and imaging. Met with pt, spouse, and 2 daughters at bedside. Pt appears weaker and has refused lactulose at times. Family notes decreasing oral intake. Pt is still oriented at this time. Pt not currently GIP hospice candidate however appears to be headed that direction. I discussed wishes with pt and family. Pt focused on quality of life. He has little support at home unfortunately which makes home discharge not safe at this time. He would like to try rehab to see if he can improve functional status. I discussed the possibility of acute decline at rehab leading to readmission and family understood the risks. I also explained that pt has the right to refuse medications if he wishes as at some point we are prolonging suffering. Pt and family expressed their understanding. I discussed code status and pt wishes for DNR, family in agreement. Order placed Continue morphine for pain and phenergan for nausea Will continue to follow I spent greater than 25 mins on advanced care planning. Will discuss findings with members of the interdisciplinary team.   
 
More than 50% of this 25 minute visit was spent counseling and coordination of care as outlined above. Subjective:  
 
Review of Systems negative with the exception of as noted above Objective:  
 
Visit Vitals /70 Pulse 84 Temp 97.6 °F (36.4 °C) Resp 18 Ht 5' 10\" (1.778 m) Wt 166 lb 1.6 oz (75.3 kg) SpO2 97% BMI 23.83 kg/m² Physical Exam: 
 
General:  Cooperative. No acute distress. Eyes:  Conjunctivae/corneas clear Nose: Nares normal. Septum midline. Neck: Supple, symmetrical, trachea midline, no JVD Lungs:   Clear to auscultation bilaterally, unlabored Heart:  Regular rate and rhythm, no murmur Abdomen:   Soft, non-tender, mild distention. Extremities: Normal, atraumatic, no cyanosis or edema Skin: Skin color, texture, turgor normal. No rash or lesions. Neurologic: Nonfocal  
Psych: Alert and oriented Signed By: Alexander Fallon MD   
 May 27, 2021

## 2021-05-27 NOTE — PROGRESS NOTES
CM received choices for STR for patient. Order is as follows: [facility] [address] [facility] Referrals have been sent via [facility] or fax.

## 2021-05-27 NOTE — PROGRESS NOTES
Spiritual Care Visit, initial visit. Visited with patient at bedside. Prayed for patient's healing and health. Visit by Saeid Null, Staff .  Heidy., Nory.B., B.A.

## 2021-05-27 NOTE — PROGRESS NOTES
[facility] Hospitalist Progress Note Name:  Terrance Abarca  Age:75 y.o. Sex:male :  1945 MRN:  [id number] Admit Date:  2021 Reason for Admission: 
GIB (gastrointestinal bleeding) [K92.2] Acute hepatic encephalopathy [K72.00] Hyperkalemia [E87.5] Assessment & Plan Hepatic encephalopathy 
-We will give lactulose 
-Monitor his ammonia levels  
-He is nonfocal. 
-Low threshold for CT head and further indicated. 21 Resolving;  Patient has been declining before hospitalization per family; likely 2/2 elevated ammonia levels. Continue lactulose and monitor 21 Resolved; Continue Lactulose. He will need long term :  Ammonia: 87 last pm, repeat ammonia and lactic acid in am  
:  Labs ordered for tomorrow 21 Ammonia trending upward and patient is refusing lactulose. He can discharge with hospice but unclear if family is willing to take patient home. Attempts to call family unsuccessful by self and CM. Patient is currently alert and oriented. Monitor ammonia level. If patient continues to refuse may need to do rectal lactulose 21 Lactulose with apple juice per patient request. SNF vs home with hospice 
  
Cirrhosis of the liver/Ascites 
-Continue appropriate medical management 
-GI input appreciated 21 Abd US showing large volume ascites, patent portal veins and cholelithiasis. No suspicious hepatic masses. Patient has declined pursuing liver transplant. Continue Lactulose to a goal of 3 BMs per day 
 Palliative consult to establish [address] and possible hospice consult. Patient does have poor prognosis per GI; appreciate assistance 21 Restart lasix/spironalcatone  in a.m. 
21 Patient is considering hospice. Will continue to treat as above 21 Hospice consulted. Hospice vs . Patient is unsure if he wants any further evaluation at this time Monitor electrolytes, Cr   
 21 See #1;  Continue propranolol, lasix, aldactone 
  
Grade 2 Varices without bleeding 
-Patient has a positive fecal occult 
-Stool is not grossly black 
-We will monitor H&H and transfuse as appropriate 
-GI consulted 
-Continue IV Protonix 
-EGD per them 
-N.p.o. for now 
-Serial H&H 
-Transfuse for any hemoglobin less than 7 
5/19/21 EGD showing EGD showing esophageal varices G2 without bleeding; Moderate PHG. No source of bleeding found. Hgb holding at 10.1 and will be monitored daily. 2G Na diet/protonix 5/20/21  Palliative has seen patient and he is now considering hospice care. Will discuss with family. Hgb 9.8 and will be monitored 
 5/21/21 Patient desires to be Full Code at this time. Has requested more information concerning hospice which was provided by [facility]. PT/OT eval pending 5/22/21 Hospice consulted with recs pending; CBC daily, control BP 
  
Hyperkalemia 
-He got calcium gluconate in the ER we will continue him on remote telemetry at this time  
-We will also give dextrose and insulin 
-Continue dextrose in setting of n.p.o. status secondary to GI bleed with plans for possible EGD in the a.m. 
-Repeat potassium levels today 5/19/21 Resolved K+ 5.0 and will be monitored 5/26/21 K+ 4.3  
  
Hyponatremia 5/19/21 Na 127; likely 2/2 volume overload with ascites; Fluid restriction; urine Na and urine osmol pending; IR for paracentesis prn 
5/20/21 Continuing slow correction; now 128; urine Na/Osmol pending 5/21/21 Na chronically low; Will monitor with fluid restrictions 5/26/21 Patient appears to be at his baseline Na 130 
  
Bleeding from umbilicus 
-This was Dermabond in the ER 
-Likely the source of significant bleeding per them 
-We will monitor 5/19/21 Wound care consulted; wound with serosanguinous drainage 5/20/21 Wound team did see patient  with wound care as directed  
5/21/21 Scant drainage noted; Hgb 9.8  
 5/22/21 No drainage on dsg;  Monitor CBC 
  
  
  
 
 
 
 
Diet:  DIET CARDIAC 
DIET NUTRITIONAL SUPPLEMENTS 
DVT PPx: SCDs GI Ppx: None Code: DNR Dispo/Discharge Planning: Dispo pending Hospital Course/Subjective:  
Patient is a 70 y. o. male with medical h/o significant for cirrhosis and varices with a history of paracentesis. He presented to the ER with a complaint of fatigue and some blood in the toilet with a concern for rectal bleeding. Denied any hematemesis or any other associated symptoms.  He was scheduled for a right upper quadrant US and liver biopsy but had to cancel due to his spouse being hospitalized and fear of what would be found in the biopsy.   
  
 
 
Subjective/24 hr Events (05/27/21): 
5/26/21 Per report family is unable to care for patient at home. Patient is requesting discharge to home. Difficult resolution of conflict due to inability to contact family  
 5/27/21 Talked with family who have requested STR if possible for patient. Placement may be difficult due to patient's poor prognosis. CM is aware of family request and now working on placement. Wife reports she is unable to care for patient at home even with home hospice Review of Systems: 14 point review of systems is otherwise negative with the exception of the elements mentioned above. Objective:  
 
Patient Vitals for the past 24 hrs: 
 Temp Pulse Resp BP SpO2  
05/27/21 1122 97.4 °F (36.3 °C) 97 18 114/69 98 % 05/27/21 0716 97.6 °F (36.4 °C) 84 18 116/70 97 % 05/27/21 0302 98.2 °F (36.8 °C) 83 17 100/64 97 % 05/26/21 2331 98.3 °F (36.8 °C) 94 17 102/64 98 % 05/26/21 1933 98.1 °F (36.7 °C) 91 18 100/62 98 % 05/26/21 1548 98.1 °F (36.7 °C) 83 17 116/69 98 % Oxygen Therapy O2 Sat (%): 98 % (05/27/21 1122) Pulse via Oximetry: 106 beats per minute (05/19/21 1147) O2 Device: None (Room air) (05/26/21 1915) O2 Flow Rate (L/min): 4 l/min (05/20/21 0032) Body mass index is 23.83 kg/m². Physical Exam:  
General: No acute distress, ill appearing, speaking in full sentences, no use of accessory muscles HEENT: Pupils equal; oropharynx is clear Neck: no JVD Lungs: Clear to auscultation bilaterally Cardiovascular: Regular rate and rhythm with chronic murmur Abdomen: Soft, nontender, distended, normoactive bowel sounds Extremities: No cyanosis clubbing or edema Neuro: Nonfocal, A&O x 2 Psych: Normal affect Data Review: 
I have reviewed all labs, meds, and studies from the last 24 hours: 
 
Labs: 
 
No results found for this or any previous visit (from the past 24 hour(s)). All Micro Results None EKG Results Procedure 720 Value Units Date/Time EKG, 12 LEAD, SUBSEQUENT [[id number]] Collected: 05/18/21 1224 Order Status: Completed Updated: 05/18/21 1549 Ventricular Rate 111 BPM   
  Atrial Rate 111 BPM   
  P-R Interval 160 ms QRS Duration 76 ms   
  Q-T Interval 320 ms QTC Calculation (Bezet) 435 ms Calculated P Axis 68 degrees Calculated R Axis 12 degrees Calculated T Axis 68 degrees Diagnosis --  
  !! AGE AND GENDER SPECIFIC ECG ANALYSIS !! Sinus tachycardia Otherwise normal ECG When compared with ECG of 18-MAY-2021 10:40, No significant change was found Confirmed by [facility]  MD ()Vanessa ([id number]) on 5/18/2021 3:48:40 PM 
  
 EKG 12 LEAD INITIAL [[id number]] Collected: 05/18/21 1040 Order Status: Completed Updated: 05/18/21 1544 Ventricular Rate 115 BPM   
  Atrial Rate 115 BPM   
  P-R Interval 156 ms QRS Duration 70 ms Q-T Interval 302 ms QTC Calculation (Bezet) 417 ms Calculated P Axis 61 degrees Calculated R Axis 2 degrees Calculated T Axis 79 degrees Diagnosis --  
  !! AGE AND GENDER SPECIFIC ECG ANALYSIS !! Sinus tachycardia 
possible  Inferior infarct , age undetermined 
low voltage Abnormal ECG No previous ECGs available Confirmed by [facility]  MD ()Vanessa ([id number]) on 5/18/2021 3:43:43 PM 
  
  
 
 
Other Studies: XR CHEST PA LAT Result Date: 5/18/2021 EXAM: XR CHEST PA LAT INDICATION: weakness COMPARISON: None FINDINGS: The cardiomediastinal silhouette is within normal limits. Atherosclerotic calcifications in the aorta. No pleural effusion or pneumothorax. Pulmonary venous congestion with suggestion of increased interstitial opacities with bibasilar predominance. No focal consolidation. Osseous structures are grossly intact with diffuse osteopenia. 1. Pulmonary venous congestion with probable mild interstitial pulmonary edema. 2. Atherosclerosis. US ABD LTD Result Date: 5/18/2021 Exam: Right upper quadrant ultrasound. Indication: Nausea, elevated LFTs, cirrhosis Comparison: Abdominal ultrasound, 8/2/2018 Findings: Pancreas: Not well visualized due to shadowing bowel gas. Liver: The liver demonstrates nodular contour and heterogeneous echotexture throughout. No suspicious hepatic masses. The main portal vein is patent with an appropriate direction of flow and velocity. Gallbladder: Gallbladder wall is normal in thickness. The gallbladder is not distended. There is cholelithiasis. No pericholecystic fluid. Absent sonographic Jordan sign per the sonographer. Biliary: No intra or extrahepatic biliary duct dilatation. Right kidney: The right kidney is normal in echogenicity and normal in size measuring 9.9 cm. No contour deforming mass. No hydronephrosis or perinephric fluid. Abdominal Aorta: Nonaneurysmal in the visualized portion. Inferior Vena Cava: Patent. Ascites: Large volume ascites. 1. Morphologic changes of hepatic cirrhosis with large volume of abdominopelvic ascites. 2. The portal veins remain patent with appropriate direction of flow. 3. Cholelithiasis without acute cholecystitis. Current Meds:  
Current Facility-Administered Medications Medication Dose Route Frequency  tuberculin injection 5 Units  5 Units IntraDERMal ONCE  
 morphine injection 2 mg  2 mg IntraVENous Q2H PRN  
 lactulose (CHRONULAC) 10 gram/15 mL solution 45 mL  30 g Oral QID  pantoprazole (PROTONIX) tablet 40 mg  40 mg Oral ACB  propranoloL (INDERAL) tablet 20 mg  20 mg Oral TID  [Held by provider] traMADoL (ULTRAM) tablet 50 mg  50 mg Oral Q6H PRN  
 furosemide (LASIX) tablet 40 mg  40 mg Oral DAILY  spironolactone (ALDACTONE) tablet 25 mg  25 mg Oral DAILY  sodium chloride (NS) flush 5-40 mL  5-40 mL IntraVENous Q8H  
 sodium chloride (NS) flush 5-40 mL  5-40 mL IntraVENous PRN  
 ondansetron (ZOFRAN) injection 4 mg  4 mg IntraVENous Q4H PRN Problem List: 
Hospital Problems as of 5/27/2021 Date Reviewed: 4/22/2021 Codes Class Noted - Resolved POA Moderate protein-calorie malnutrition ([facility] ICD-10-CM: E44.0 ICD-9-CM: 263.0  5/26/2021 - Present Yes Hyperkalemia ICD-10-CM: E87.5 ICD-9-CM: 276.7  5/18/2021 - Present Unknown GIB (gastrointestinal bleeding) ICD-10-CM: K92.2 ICD-9-CM: 578.9  5/18/2021 - Present Unknown * (Principal) Acute hepatic encephalopathy ICD-10-CM: K72.00 ICD-9-CM: 572.2  5/18/2021 - Present Unknown Other cirrhosis of liver ([facility] ICD-10-CM: Y08.54 ICD-9-CM: 571.5  8/28/2017 - Present Yes Overview Addendum 1/22/2018  6:39 PM by Benjamin Paul MD  
  improving DM2 (diabetes mellitus, type 2) (HCC) ICD-10-CM: E11.9 ICD-9-CM: 250.00  9/19/2012 - Present Yes Overview Addendum 1/22/2018  6:39 PM by Benjamin Paul MD  
   HA1c now at 9.1; pt planning on continuing with diet and exercise. Goal 200 lbs, goal Ha1 <7.0 Eye exam UTD for this year; no change reported. HTN (hypertension) ICD-10-CM: I10 
ICD-9-CM: 401.9  9/19/2012 - Present Yes Overview Addendum 8/28/2017  9:16 AM by Benjamin Paul MD  
  At [facility].  Off Norvasc. Pt plans to continue with diet, exercise, and 15 lbs weight loss Part of this note was written by using a voice dictation software and the note has been proof read but may still contain some grammatical/other typographical errors.  
 
Signed By: Marisa Bianchi Otto Molina  [facility]  May 27, 2021  1:18 PM

## 2021-05-27 NOTE — PROGRESS NOTES
Problem: Pressure Injury - Risk of 
Goal: *Prevention of pressure injury Description: Document Rio Scale and appropriate interventions in the flowsheet. Outcome: Progressing Towards Goal 
Note: Pressure Injury Interventions: 
Sensory Interventions: Assess changes in LOC, Check visual cues for pain, Minimize linen layers Moisture Interventions: Absorbent underpads, Check for incontinence Q2 hours and as needed Activity Interventions: Pressure redistribution bed/mattress(bed type) Mobility Interventions: Pressure redistribution bed/mattress (bed type) Nutrition Interventions: Document food/fluid/supplement intake, Offer support with meals,snacks and hydration Friction and Shear Interventions: HOB 30 degrees or less, Minimize layers Problem: Patient Education: Go to Patient Education Activity Goal: Patient/Family Education Outcome: Progressing Towards Goal 
  
Problem: Falls - Risk of 
Goal: *Absence of Falls Description: Document [facility] Fall Risk and appropriate interventions in the flowsheet. Outcome: Progressing Towards Goal 
Note: Fall Risk Interventions: 
Mobility Interventions: Communicate number of staff needed for ambulation/transfer, Patient to call before getting OOB Mentation Interventions: Adequate sleep, hydration, pain control, Evaluate medications/consider consulting pharmacy, More frequent rounding, Toileting rounds Medication Interventions: Patient to call before getting OOB, Teach patient to arise slowly Elimination Interventions: Call light in reach, Patient to call for help with toileting needs History of Falls Interventions: Consult care management for discharge planning Problem: Patient Education: Go to Patient Education Activity Goal: Patient/Family Education Outcome: Progressing Towards Goal 
  
Problem: Patient Education: Go to Patient Education Activity Goal: Patient/Family Education Outcome: Progressing Towards Goal 
  
Problem: Patient Education: Go to Patient Education Activity Goal: Patient/Family Education Outcome: Progressing Towards Goal 
  
Problem: Nutrition Deficit Goal: *Optimize nutritional status Outcome: Progressing Towards Goal

## 2021-05-28 NOTE — DISCHARGE INSTRUCTIONS
Umbilical hernia wound: cleanse with wound , apply foam dressing daily and prn. CBC and CMP next lab day. Gastrointestinal Esophagogastroduodenoscopy (EGD)/ Endoscopic Ultrasound(EUS)- Upper Exam Discharge Instructions    1. Call Dr. Rohith Hobbs  for any problems or questions. 2. Contact the doctor's office for follow up appointment as directed. 3. Medication may cause drowsiness for several hours, therefore, do not drive or operate machinery for remainder of the day. 4. No alcohol today. 5. Do not make any important decisions such as signing legal paperwork. 6. Ordinarily, you may resume regular diet and activity after exam unless otherwise specified by your physician. 7. For mild soreness in your throat you may use Cepacol throat lozenges or warm  salt-water gargles as needed. Any additional instructions:   Start propranolol, advance diet        Instructions given to Gurvinder Face and other family members.

## 2021-05-28 NOTE — PROGRESS NOTES
Palliative Care Progress Note Patient: Abena Bocanegra MRN: [id number]  SSN: [id number] YOB: 1945  Age: 70 y.o. Sex: male Assessment/Plan: Chief Complaint/Interval History: pt resting comfortably in chair at bedside. Pursuing rehab Principal Diagnosis: · Fatigue, Lethargy  R53.83 Additional Diagnoses: · Debility, Unspecified  R53.81 
· Frailty  R54 · Counseling, Encounter for Medical Advice  Z71.9 
· Encounter for Palliative Care  Z51.5 Palliative Performance Scale (PPS) Medical Decision Making:  
Reviewed and summarized labs and imaging. No family present this am.  Pt appears comfortable in chair at bedside. Has been taking lactulose as scheduled per mar. Will continue to follow and assist with family support and symptom management. Will discuss findings with members of the interdisciplinary team.   
 
More than 50% of this 25 minute visit was spent counseling and coordination of care as outlined above. Subjective:  
 
Review of Systems negative with the exception of as noted above Objective:  
 
Visit Vitals BP (!) 107/55 Pulse 66 Temp 98.1 °F (36.7 °C) Resp 18 Ht 5' 10\" (1.778 m) Wt 166 lb 1.6 oz (75.3 kg) SpO2 98% BMI 23.83 kg/m² Physical Exam: 
 
General:  Cooperative. No acute distress. Eyes:  Conjunctivae/corneas clear Nose: Nares normal. Septum midline. Neck: Supple, symmetrical, trachea midline, no JVD Lungs:   Clear to auscultation bilaterally, unlabored Heart:  Regular rate and rhythm, no murmur Abdomen:   Soft, non-tender, mild distention. Extremities: Normal, atraumatic, no cyanosis or edema Skin: Skin color, texture, turgor normal. No rash or lesions. Neurologic: Nonfocal  
Psych: Alert and oriented Signed By: Eric Shay MD   
 May 28, 2021

## 2021-05-28 NOTE — PROGRESS NOTES
TRANSFER - OUT REPORT: 
 
Verbal report given to Shyla Nevarez at [facility] (name) on Candi Begum  being transferred to SNF (unit) for routine progression of care Report consisted of patients Situation, Background, Assessment and  
Recommendations(SBAR). Information from the following report(s) SBAR, Procedure Summary, Intake/Output, MAR and Recent Results was reviewed with the receiving nurse. Lines:  
Saline Lock 05/18/21 Right Antecubital (Active) Site Assessment Clean, dry, & intact 05/28/21 0700 Phlebitis Assessment 0 05/28/21 0700 Infiltration Assessment 0 05/28/21 0700 Dressing Status Clean, dry, & intact; Occlusive 05/28/21 0700 Dressing Type Tape;Transparent 05/28/21 0700 Hub Color/Line Status Capped;Flushed;Patent 05/28/21 0700 Opportunity for questions and clarification was provided. Medical transport scheduled for 3:30 PM.

## 2021-05-28 NOTE — DISCHARGE SUMMARY
[address] [facility] Hospitalist Discharge Summary Name:  Esteban Buerger    Age:75 y.o. Sex:male :  1945 MRN:  [id number] Admitting Physician: Jessica Castillo MD Admit Date: 2021 10:24 AM  
Attending Physician: Emeka Melendez MD 
Primary Care Physician: Charito Gibbs MD  
 
 
Discharge Physician: Cornell Gaines NP  Discharge date: 21 Discharged Condition: Stable Indication for Admission: Chief Complaint Patient presents with  Fatigue Reasons for hospitalization: 
Hospital Problems as of 2021 Date Reviewed: 2021 Codes Class Noted - Resolved POA Moderate protein-calorie malnutrition ([facility] [address] ICD-10-CM: E44.0 ICD-9-CM: 263.0  2021 - Present Yes Hyperkalemia ICD-10-CM: E87.5 ICD-9-CM: 276.7  2021 - Present Unknown GIB (gastrointestinal bleeding) ICD-10-CM: K92.2 ICD-9-CM: 578.9  2021 - Present Unknown * (Principal) Acute hepatic encephalopathy ICD-10-CM: K72.00 ICD-9-CM: 572.2  2021 - Present Unknown Other cirrhosis of liver ([facility] ICD-10-CM: Y45.12 ICD-9-CM: 571.5  2017 - Present Yes Overview Addendum 2018  6:39 PM by Joan Chapa MD  
  improving DM2 (diabetes mellitus, type 2) (HCC) ICD-10-CM: E11.9 ICD-9-CM: 250.00  2012 - Present Yes Overview Addendum 2018  6:39 PM by Joan Chapa MD  
   HA1c now at 9.1; pt planning on continuing with diet and exercise. Goal 200 lbs, goal Ha1 <7.0 Eye exam UTD for this year; no change reported. HTN (hypertension) ICD-10-CM: I10 
ICD-9-CM: 401.9  2012 - Present Yes Overview Addendum 2017  9:16 AM by Joan Chapa MD  
  At goal.  Off Norvasc. Pt plans to continue with diet, exercise, and 15 lbs weight loss Discharge Diagnosis: Acute hepatic encephalopathy, cirrhosis of liver, GIB, DM2 Did Patient have Sepsis (YES OR NO): No 
 
 
Hospital Course/Interval history: 70 y.o. male with medical h/o significant for cirrhosis and varices with a history of paracentesis. He presented to the ER with a complaint of fatigue and some blood in the toilet with a concern for rectal bleeding. Denied any hematemesis or any other associated symptoms.  He was scheduled for a right upper quadrant US and liver biopsy but had to cancel due to his spouse being hospitalized and fear of what would be found in the biopsy.   
Grade 2 varices found on EGD by GI 5/19, poor prognosis, palliative care has followed, patient and family want SNF for strength training before considering hospice at this time. Assessment/Plan: 
Hepatic encephalopathy 5/28 patient has waxed and waned taking Lactulose, taken now with juice. Will continue at SNF. 
  
  
Cirrhosis of the liver/Ascites 
-Continue appropriate medical management 
-GI input appreciated 5/19/21 Abd US showing large volume ascites, patent portal veins and cholelithiasis. No suspicious hepatic masses. Patient has declined pursuing liver transplant. Continue Lactulose to a goal of 3 BMs per day 
 Palliative consult to establish [address] and possible hospice consult. Patient does have poor prognosis per GI; appreciate assistance  
 5/26/21 See #1; Continue propranolol, lasix, aldactone 
 5/28 will be discharged on above medications, palliative care has been following, poor prognosis but family and patient request SNF for strength training. Grade 2 Varices without bleeding 5/28 palliative care has been following, poor prognosis but family and patient request SNF for strength training. 
 
  
Hyperkalemia 5/28 resolved, 4.0 
  
Hyponatremia 5/19/21 Na 127; likely 2/2 volume overload with ascites; Fluid restriction; urine Na and urine osmol pending; IR for paracentesis prn 
5/20/21 Continuing slow correction; now 128; urine Na/Osmol pending 5/21/21 Na chronically low; Will monitor with fluid restrictions 5/26/21 Patient appears to be at his baseline Na 130 
  
Bleeding from umbilicus 
-This was Dermabond in the ER 
-Likely the source of significant bleeding per them 5/28 foam dressing changes daily and prn and SNF Consults:  
IP CONSULT TO PALLIATIVE CARE - PROVIDER Disposition: [place] Diet: DIET CARDIAC 
DIET NUTRITIONAL SUPPLEMENTS Code Status: DNR Current Discharge Medication List  
  
START taking these medications Details  
lactulose (CHRONULAC) 10 gram/15 mL solution Take 45 mL by mouth four (4) times daily for 30 days. Qty: 5400 mL, Refills: 0 Start date: 5/28/2021, End date: 6/27/2021  
  
propranoloL (INDERAL) 20 mg tablet Take 1 Tablet by mouth three (3) times daily for 15 days. Qty: 45 Tablet, Refills: 0 Start date: 5/28/2021, End date: 6/12/2021  
  
pantoprazole (PROTONIX) 40 mg tablet Take 1 Tablet by mouth Daily (before breakfast) for 30 days. Qty: 30 Tablet, Refills: 0 Start date: 5/29/2021, End date: 6/28/2021 CONTINUE these medications which have NOT CHANGED Details  
metFORMIN (GLUCOPHAGE) 500 mg tablet TAKE 1 TABLET BY MOUTH TWICE DAILY WITH MEALS Qty: 180 Tab, Refills: 4 Associated Diagnoses: Type 2 diabetes mellitus with other specified complication, without long-term current use of insulin ([address] furosemide (Lasix) 40 mg tablet Take 1 Tab by mouth every other day. Qty: 90 Tab, Refills: 1 Associated Diagnoses: Other cirrhosis of liver ([address] minocycline (DYNACIN) 100 mg tablet Take 1 Tab by mouth daily. Qty: 90 Tab, Refills: 1 Associated Diagnoses: Rosacea  
  
spironolactone (ALDACTONE) 50 mg tablet Take 1 Tab by mouth two (2) times a day. Qty: 180 Tab, Refills: 4 Associated Diagnoses: Other cirrhosis of liver ([address] STOP taking these medications  
  
 ciprofloxacin HCl (CILOXAN) 0.3 % ophthalmic solution Comments:  
Reason for Stopping:   
   
 erythromycin (ILOTYCIN) ophthalmic ointment Comments:  
Reason for Stopping:   
   
 blood sugar diagnostic (ONETOUCH ULTRA TEST) Comments:  
Reason for Stopping:   
   
 glucose blood VI test strips (OneTouch Ultra Blue Test Strip) strip Comments:  
Reason for Stopping:   
   
 OneTouch Delica Lancets 30 gauge misc Comments:  
Reason for Stopping:   
   
 OneTouch Ultra2 Meter misc Comments:  
Reason for Stopping:   
   
 valACYclovir (VALTREX) 1 gram tablet Comments:  
Reason for Stopping:   
   
 predniSONE (DELTASONE) 20 mg tablet Comments:  
Reason for Stopping:   
   
 rOPINIRole (Requip) 0.25 mg tablet Comments:  
Reason for Stopping:   
   
 ascorbic acid (VITAMIN C PO) Comments:  
Reason for Stopping:   
   
 simvastatin (ZOCOR) 20 mg tablet Comments:  
Reason for Stopping:   
   
 multivit with minerals/lutein (MULTIVITAMIN 50 PLUS PO) Comments:  
Reason for Stopping:   
   
 cholecalciferol (VITAMIN D3) 2,000 unit cap capsule Comments:  
Reason for Stopping:   
   
 magnesium oxide (MAG-OX) 400 mg tablet Comments:  
Reason for Stopping:   
   
  
 
 
Medications Discontinued During This Encounter Medication Reason  pantoprazole (PROTONIX) 40 mg in 0.9% sodium chloride 10 mL injection  pantoprazole (PROTONIX) 40 mg in 0.9% sodium chloride 10 mL injection  dextrose 10% infusion  predniSONE (DELTASONE) 20 mg tablet Side Effects  erythromycin (ILOTYCIN) ophthalmic ointment Therapy Completed  ciprofloxacin HCl (CILOXAN) 0.3 % ophthalmic solution Side Effects  lactulose (CHRONULAC) 10 gram/15 mL solution 30 mL  lactulose (CHRONULAC) 10 gram/15 mL solution 45 mL Follow Up Orders: Follow-up Appointments Procedures  FOLLOW UP VISIT Appointment in: 3 - [address] MD  
  Standing Status:   Standing Number of Occurrences:   1 Order Specific Question:   Appointment in Answer:   3 - 5 Days Follow-up Information Follow up With Specialties Details Why Contact Info  [address] [facility] [address] 
[phone] Wilmer Sands MD Internal Medicine   [address]  [address] [facility] [address] 
[phone] Discharge Exam: 
 
Patient Vitals for the past 24 hrs: 
 Temp Pulse Resp BP SpO2  
05/28/21 1052 97.8 °F (36.6 °C) 73 18 109/71 98 % 05/28/21 0725 98.1 °F (36.7 °C) 66 18 (!) 107/55 98 % 05/28/21 0358 97.6 °F (36.4 °C) 88 18 107/76 94 % 05/28/21 0023 98.2 °F (36.8 °C) 74 18 99/64 97 % 05/27/21 1924 98 °F (36.7 °C) 83 18 103/67 97 % 05/27/21 1534 97.6 °F (36.4 °C) 97 18 105/67 96 % Oxygen Therapy O2 Sat (%): 98 % (05/28/21 1052) Pulse via Oximetry: 106 beats per minute (05/19/21 1147) O2 Device: None (Room air) (05/28/21 0700) O2 Flow Rate (L/min): 4 l/min (05/20/21 0032) Estimated body mass index is 23.83 kg/m² as calculated from the following: 
  Height as of this encounter: 5' 10\" (1.778 m). Weight as of this encounter: 75.3 kg (166 lb 1.6 oz). Intake/Output Summary (Last 24 hours) at 5/28/2021 1156 Last data filed at 5/28/2021 8629 Gross per 24 hour Intake 580 ml Output 1025 ml Net -445 ml *Note that automatically entered I/Os may not be accurate; dependent on patient compliance with collection and accurate  by assistants. Physical Exam:  
General: No acute distress, ill appearing, speaking in full sentences, no use of accessory muscles HEENT: Pupils equal; oropharynx is clear Neck: no JVD Lungs: Clear to auscultation bilaterally Cardiovascular: Regular rate and rhythm with chronic murmur Abdomen: Soft, nontender, distended, normoactive bowel sounds Extremities: No cyanosis clubbing or edema Neuro: Nonfocal, A&O x 2 Psych: Normal affect All Labs from Last 24 Hrs: 
Recent Results (from the past 24 hour(s)) PLEASE READ & DOCUMENT PPD TEST IN 24 HRS Collection Time: 05/27/21  3:56 PM  
Result Value Ref Range  PPD Negative Negative  
 mm Induration 0 0 - 5 mm CBC W/O DIFF Collection Time: 05/28/21  4:44 AM  
Result Value Ref Range WBC 8.7 4.3 - 11.1 K/uL  
 RBC 2.88 (L) 4.23 - 5.6 M/uL HGB 9.8 (L) 13.6 - 17.2 g/dL HCT 27.8 (L) 41.1 - 50.3 % MCV 96.5 79.6 - 97.8 FL  
 MCH 34.0 (H) 26.1 - 32.9 PG  
 MCHC 35.3 (H) 31.4 - 35.0 g/dL  
 RDW 14.6 11.9 - 14.6 % PLATELET 82 (L) 955 - 450 K/uL MPV 11.4 9.4 - 12.3 FL ABSOLUTE NRBC 0.00 0.0 - 0.2 K/uL METABOLIC PANEL, BASIC Collection Time: 05/28/21  4:44 AM  
Result Value Ref Range Sodium 129 (L) 138 - 145 mmol/L Potassium 4.0 3.5 - 5.1 mmol/L Chloride 95 (L) 98 - 107 mmol/L  
 CO2 28 21 - 32 mmol/L Anion gap 6 (L) 7 - 16 mmol/L Glucose 134 (H) 65 - 100 mg/dL BUN 35 (H) 8 - 23 MG/DL Creatinine 1.26 0.8 - 1.5 MG/DL  
 GFR est AA >60 >60 ml/min/1.73m2 GFR est non-AA 60 (L) >60 ml/min/1.73m2 Calcium 8.1 (L) 8.3 - 10.4 MG/DL  
AMMONIA Collection Time: 05/28/21  4:44 AM  
Result Value Ref Range Ammonia 42 (H) 11 - 32 UMOL/L All Micro Results Procedure Component Value Units Date/Time COVID-19 RAPID TEST [[id number]] Order Status: Sent SARS-CoV-2 Lab Results \"Novel Coronavirus\" Test: No results found for: COV2NT \"Emergent Disease\" Test: No results found for: EDPR \"SARS-COV-2\" Test: No results found for: XGCOVT Rapid Test:  
No results found for: COVR Diagnostic Imaging/Tests:  
CT HEAD WO CONT Result Date: 5/23/2021 No acute process. Echocardiogram/EKG results: No results found for this visit on 05/18/21. EKG Results Procedure 720 Value Units Date/Time EKG, 12 LEAD, SUBSEQUENT [[id number]] Collected: 05/18/21 1224 Order Status: Completed Updated: 05/18/21 1549 Ventricular Rate 111 BPM   
  Atrial Rate 111 BPM   
  P-R Interval 160 ms QRS Duration 76 ms   
  Q-T Interval 320 ms QTC Calculation (Bezet) 435 ms Calculated P Axis 68 degrees Calculated R Axis 12 degrees   Calculated T Axis 68 degrees Diagnosis --  
  !! AGE AND GENDER SPECIFIC ECG ANALYSIS !! Sinus tachycardia Otherwise normal ECG When compared with ECG of 18-MAY-2021 10:40, No significant change was found Confirmed by Milo Horne MD (), Marcin Dave ([id number]) on 5/18/2021 3:48:40 PM 
  
 EKG 12 LEAD INITIAL [[id number]] Collected: 05/18/21 1040 Order Status: Completed Updated: 05/18/21 1544 Ventricular Rate 115 BPM   
  Atrial Rate 115 BPM   
  P-R Interval 156 ms QRS Duration 70 ms Q-T Interval 302 ms QTC Calculation (Bezet) 417 ms Calculated P Axis 61 degrees Calculated R Axis 2 degrees Calculated T Axis 79 degrees Diagnosis --  
  !! AGE AND GENDER SPECIFIC ECG ANALYSIS !! Sinus tachycardia 
possible  Inferior infarct , age undetermined 
low voltage Abnormal ECG No previous ECGs available Confirmed by Milo Horne MD (), Marcin Dave ([id number]) on 5/18/2021 3:43:43 PM 
  
  
 
 
Results for orders placed or performed during the hospital encounter of 05/18/21 EKG, 12 LEAD, INITIAL Result Value Ref Range Ventricular Rate 115 BPM  
 Atrial Rate 115 BPM  
 P-R Interval 156 ms QRS Duration 70 ms Q-T Interval 302 ms QTC Calculation (Bezet) 417 ms Calculated P Axis 61 degrees Calculated R Axis 2 degrees Calculated T Axis 79 degrees Diagnosis    
  !! AGE AND GENDER SPECIFIC ECG ANALYSIS !! Sinus tachycardia 
possible  Inferior infarct , age undetermined 
low voltage Abnormal ECG No previous ECGs available Confirmed by Milo Horne MD (), Marcin Dave ([id number]) on 5/18/2021 3:43:43 PM 
  
 
 
Procedures done this admission: 
Procedure(s): ESOPHAGOGASTRODUODENOSCOPY (EGD)/ BMI 24 ROOM 206 Time spent in patient discharge planning and coordination 40 minutes. Signed By: Tom Quinonez  [facility]  May 28, 2021  11:56 AM

## 2021-05-28 NOTE — PROGRESS NOTES
Pt is for discharge to [facility] and [address] this afternoon. Family at bedside with pt. [address] transport DNR completed with original to pt, copy to transport and chart. Packet prepared to go with pt to SNF. [facility] to transport pt around 1615. Care Management Interventions PCP Verified by CM: Yes (Dr. Yumiko Lee) Mode of Transport at Discharge: Other (see comment) (To be determined based on needs) Transition of Care Consult (CM Consult): SNF Partner SNF: Yes Discharge Durable Medical Equipment: No 
Physical Therapy Consult: No 
Occupational Therapy Consult: No 
Speech Therapy Consult: No 
Current Support Network: Own Home, Lives with Spouse, Family Lives [place] Confirm Follow Up Transport: Self The Plan for Transition of Care is Related to the Following Treatment Goals : Pt needs rehab to return to his functional baseline. The Patient and/or Patient Representative was Provided with a Choice of Provider and Agrees with the Discharge Plan?: Yes Name of the Patient Representative Who was Provided with a Choice of Provider and Agrees with the Discharge Plan: pt/spouse Freedom of Choice List was Provided with Basic Dialogue that Supports the Patient's Individualized Plan of Care/Goals, Treatment Preferences and Shares the Quality Data Associated with the Providers?: Yes [employer] Information Provided?: No 
Discharge Location Discharge Placement: Skilled nursing facility [facility] [address] [facility] [address] report [phone])

## (undated) DEVICE — 1200 GUARD II KIT W/5MM TUBE W/O VAC TUBE: Brand: GUARDIAN

## (undated) DEVICE — MEDI-VAC YANKAUER SUCTION HANDLE W/BULBOUS TIP: Brand: CARDINAL HEALTH

## (undated) DEVICE — CONNECTOR TBNG OD5-7MM O2 END DISP

## (undated) DEVICE — BLOCK BITE AD 60FR W/ VELC STRP ADDRESSES MOST PT AND

## (undated) DEVICE — KENDALL RADIOLUCENT FOAM MONITORING ELECTRODE RECTANGULAR SHAPE: Brand: KENDALL

## (undated) DEVICE — CANNULA NSL ORAL AD FOR CAPNOFLEX CO2 O2 AIRLFE

## (undated) DEVICE — FORCEPS BX L240CM JAW DIA2.8MM L CAP W/ NDL MIC MESH TOOTH

## (undated) DEVICE — CONTAINER PREFIL FRMLN 40ML --